# Patient Record
Sex: FEMALE | Race: WHITE | Employment: OTHER | URBAN - METROPOLITAN AREA
[De-identification: names, ages, dates, MRNs, and addresses within clinical notes are randomized per-mention and may not be internally consistent; named-entity substitution may affect disease eponyms.]

---

## 2017-01-08 ENCOUNTER — ED HISTORICAL/CONVERTED ENCOUNTER (OUTPATIENT)
Dept: OTHER | Age: 27
End: 2017-01-08

## 2017-07-14 ENCOUNTER — ED HISTORICAL/CONVERTED ENCOUNTER (OUTPATIENT)
Dept: OTHER | Age: 27
End: 2017-07-14

## 2017-12-01 LAB
ANTIBODY SCREEN, EXTERNAL: POSITIVE
CHLAMYDIA, EXTERNAL: NEGATIVE
HBSAG, EXTERNAL: NEGATIVE
N. GONORRHEA, EXTERNAL: NEGATIVE
RUBELLA, EXTERNAL: NORMAL
TYPE, ABO & RH, EXTERNAL: NORMAL

## 2018-03-26 LAB
ANTIBODY SCREEN, EXTERNAL: NEGATIVE
ANTIBODY SCREEN, EXTERNAL: NEGATIVE
HBSAG, EXTERNAL: NEGATIVE
HIV, EXTERNAL: NON REACTIVE
RPR, EXTERNAL: NON REACTIVE

## 2018-05-21 LAB
GRBS, EXTERNAL: POSITIVE
T. PALLIDUM, EXTERNAL: NON REACTIVE

## 2018-06-24 ENCOUNTER — HOSPITAL ENCOUNTER (INPATIENT)
Age: 28
LOS: 6 days | Discharge: HOME | End: 2018-06-30
Attending: OBSTETRICS & GYNECOLOGY | Admitting: OBSTETRICS & GYNECOLOGY
Payer: OTHER GOVERNMENT

## 2018-06-24 DIAGNOSIS — G89.18 POSTOPERATIVE PAIN: Primary | ICD-10-CM

## 2018-06-24 PROBLEM — Z37.9 NORMAL LABOR: Status: ACTIVE | Noted: 2018-06-24

## 2018-06-24 LAB
ERYTHROCYTE [DISTWIDTH] IN BLOOD BY AUTOMATED COUNT: 16.3 % (ref 11.5–14.5)
HCT VFR BLD AUTO: 34.5 % (ref 35–47)
HGB BLD-MCNC: 11.7 G/DL (ref 11.5–16)
MCH RBC QN AUTO: 29.8 PG (ref 26–34)
MCHC RBC AUTO-ENTMCNC: 33.9 G/DL (ref 30–36.5)
MCV RBC AUTO: 88 FL (ref 80–99)
NRBC # BLD: 0 K/UL (ref 0–0.01)
NRBC BLD-RTO: 0 PER 100 WBC
PLATELET # BLD AUTO: 232 K/UL (ref 150–400)
PMV BLD AUTO: 12.2 FL (ref 8.9–12.9)
RBC # BLD AUTO: 3.92 M/UL (ref 3.8–5.2)
WBC # BLD AUTO: 8.1 K/UL (ref 3.6–11)

## 2018-06-24 PROCEDURE — 3E0P7VZ INTRODUCTION OF HORMONE INTO FEMALE REPRODUCTIVE, VIA NATURAL OR ARTIFICIAL OPENING: ICD-10-PCS | Performed by: OBSTETRICS & GYNECOLOGY

## 2018-06-24 PROCEDURE — 36415 COLL VENOUS BLD VENIPUNCTURE: CPT | Performed by: OBSTETRICS & GYNECOLOGY

## 2018-06-24 PROCEDURE — 3E033VJ INTRODUCTION OF OTHER HORMONE INTO PERIPHERAL VEIN, PERCUTANEOUS APPROACH: ICD-10-PCS | Performed by: OBSTETRICS & GYNECOLOGY

## 2018-06-24 PROCEDURE — 4A0HXCZ MEASUREMENT OF PRODUCTS OF CONCEPTION, CARDIAC RATE, EXTERNAL APPROACH: ICD-10-PCS | Performed by: OBSTETRICS & GYNECOLOGY

## 2018-06-24 PROCEDURE — 75410000002 HC LABOR FEE PER 1 HR

## 2018-06-24 PROCEDURE — 74011250637 HC RX REV CODE- 250/637: Performed by: OBSTETRICS & GYNECOLOGY

## 2018-06-24 PROCEDURE — 65270000029 HC RM PRIVATE

## 2018-06-24 PROCEDURE — 85027 COMPLETE CBC AUTOMATED: CPT | Performed by: OBSTETRICS & GYNECOLOGY

## 2018-06-24 RX ORDER — SODIUM CHLORIDE, SODIUM LACTATE, POTASSIUM CHLORIDE, CALCIUM CHLORIDE 600; 310; 30; 20 MG/100ML; MG/100ML; MG/100ML; MG/100ML
125 INJECTION, SOLUTION INTRAVENOUS CONTINUOUS
Status: DISCONTINUED | OUTPATIENT
Start: 2018-06-25 | End: 2018-06-28

## 2018-06-24 RX ORDER — NALBUPHINE HYDROCHLORIDE 10 MG/ML
10 INJECTION, SOLUTION INTRAMUSCULAR; INTRAVENOUS; SUBCUTANEOUS
Status: DISCONTINUED | OUTPATIENT
Start: 2018-06-24 | End: 2018-06-27 | Stop reason: HOSPADM

## 2018-06-24 RX ORDER — FENTANYL CITRATE 50 UG/ML
100 INJECTION, SOLUTION INTRAMUSCULAR; INTRAVENOUS
Status: DISCONTINUED | OUTPATIENT
Start: 2018-06-24 | End: 2018-06-27 | Stop reason: HOSPADM

## 2018-06-24 RX ORDER — ZOLPIDEM TARTRATE 5 MG/1
5 TABLET ORAL
Status: DISCONTINUED | OUTPATIENT
Start: 2018-06-24 | End: 2018-06-28

## 2018-06-24 RX ORDER — OXYTOCIN IN 5 % DEXTROSE 30/500 ML
1-25 PLASTIC BAG, INJECTION (ML) INTRAVENOUS
Status: DISCONTINUED | OUTPATIENT
Start: 2018-06-25 | End: 2018-06-28

## 2018-06-24 RX ORDER — ALBUTEROL SULFATE 5 MG/ML
SOLUTION RESPIRATORY (INHALATION)
COMMUNITY

## 2018-06-24 RX ORDER — TERBUTALINE SULFATE 1 MG/ML
0.25 INJECTION SUBCUTANEOUS AS NEEDED
Status: DISCONTINUED | OUTPATIENT
Start: 2018-06-24 | End: 2018-06-27 | Stop reason: HOSPADM

## 2018-06-24 RX ORDER — VALACYCLOVIR HYDROCHLORIDE 500 MG/1
500 TABLET, FILM COATED ORAL DAILY
COMMUNITY
End: 2018-06-30

## 2018-06-24 RX ORDER — SODIUM CHLORIDE 0.9 % (FLUSH) 0.9 %
SYRINGE (ML) INJECTION
Status: DISPENSED
Start: 2018-06-24 | End: 2018-06-25

## 2018-06-24 RX ORDER — SODIUM CHLORIDE 0.9 % (FLUSH) 0.9 %
SYRINGE (ML) INJECTION
Status: COMPLETED
Start: 2018-06-24 | End: 2018-06-24

## 2018-06-24 RX ADMIN — DINOPROSTONE 10 MG: 10 INSERT VAGINAL at 17:18

## 2018-06-24 RX ADMIN — Medication 10 ML: at 16:21

## 2018-06-24 RX ADMIN — ZOLPIDEM TARTRATE 5 MG: 5 TABLET ORAL at 21:57

## 2018-06-24 NOTE — PROGRESS NOTES
OB Hospitalist    Cervix- closed/high  Cervidil placed in the posterior fornix of the vagina without difficulty. Fetal tracing Reactive.           Jana Fernandez MD

## 2018-06-24 NOTE — H&P
Labor and Delivery Admission Note  6/24/2018    33 y.o., , female, G1 P 0 Estimated Date of Delivery: 6/19/18 by dates and US presents for IOL for postdates at 5  Reports good fetal movement, no bleeding, and no contractions. Has been on Valtrex daily for  HSV suppressive prophylaxis. PNC: Blood type:             RH: pos            Rubella:             SVII serology: neg             GBS status:   Past Medical History:   Diagnosis Date    Abnormal Papanicolaou smear of cervix 2015    colposcopy , normal since    Anemia     states hx of anemia, has been normal during pregnancy    Asthma     last used inhaler in past week    Genital herpes     states last ourbreak in May, has been taking Valtrex since before pregnancy    Psychiatric problem     depression and anxiety, had been taking medication, but stopped when found out she was pregnant     History reviewed. No pertinent surgical history. OB/GYN: Dr. Gerald Dobson:   Current Facility-Administered Medications   Medication Dose Route Frequency    dinoprostone (CERVIDIL) 10 mg vaginal insert 10 mg  10 mg Vaginal NOW    sodium chloride (NS) flush        lactated ringers bolus infusion 500-1,000 mL  500-1,000 mL IntraVENous PRN    terbutaline (BRETHINE) injection 0.25 mg  0.25 mg SubCUTAneous PRN    nalbuphine (NUBAIN) injection 10 mg  10 mg IntraVENous Q2H PRN    fentaNYL citrate (PF) injection 100 mcg  100 mcg IntraVENous Q1H PRN     Allergies: No Known Allergies  Pertinent ROS: per HPI   Family History   Problem Relation Age of Onset    Other Mother      hypothyroidism    Hypertension Brother     Diabetes Maternal Grandfather     Hypertension Maternal Grandfather      Social History     Social History    Marital status:      Spouse name: N/A    Number of children: N/A    Years of education: N/A     Occupational History    Not on file.      Social History Main Topics    Smoking status: Former Smoker     Quit date: 9/24/2017    Smokeless tobacco: Former User     Quit date: 2016    Alcohol use No    Drug use: No    Sexual activity: Yes     Partners: Male     Other Topics Concern    Not on file     Social History Narrative       OBJECTIVE:  Gravid , female NAD  No data recorded. Visit Vitals    Ht 5' 2\" (1.575 m)    Wt 77.6 kg (171 lb)    BMI 31.28 kg/m2       Labs:  No results found for: WBC    Exam:  HEENT:  normal   Lungs:  clear  Cor:  RRR  Abdomen:  Fundal height 40 cm                    Gravid, relaxed                    Clinical EFW 8 and 1/2 lbs  Fetal heart rate tracin, +accels, moderate varaibility  Contraction pattern: irregular  Cervix:  Closed/thick  Fluid:  Intact      Impression:  IUP at 40 weeks 5 days for IOL  Fetal tracing reactive  Plan for Cervidil placement  Vertex confirmed by Ultrasound.     Adele Castro MD

## 2018-06-24 NOTE — IP AVS SNAPSHOT
[address] 
[phone] Patient: Jesus Turner MRN: [id number] :1990 A check nathan indicates which time of day the medication should be taken. My Medications START taking these medications Instructions Each Dose to Equal  
 Morning Noon Evening Bedtime  
 ibuprofen 800 mg tablet Commonly known as:  MOTRIN Your last dose was: Your next dose is: Take 1 Tab by mouth every eight (8) hours as needed for Pain. 800 mg  
    
   
   
   
  
 oxyCODONE-acetaminophen 5-325 mg per tablet Commonly known as:  PERCOCET Your last dose was: Your next dose is: Take 1 Tab by mouth every six (6) hours as needed. Max Daily Amount: 4 Tabs. 1 Tab CONTINUE taking these medications Instructions Each Dose to Equal  
 Morning Noon Evening Bedtime  
 albuterol 5 mg/mL nebulizer solution Commonly known as:  PROVENTIL Your last dose was: Your next dose is:    
   
   
 by Nebulization route every four (4) hours as needed for Wheezing. Pt states 2 puffs as needed   Indications: Acute Asthma Attack PNV66-Iron Fumarate-FA-DSS-DHA 26-1.2- mg Cap Your last dose was: Your next dose is: Take 1 Tab by mouth daily. Indications: pregnancy 1 Tab STOP taking these medications   
 traMADol 50 mg tablet Commonly known as:  ULTRAM  
   
  
 VALTREX 500 mg tablet Generic drug:  valACYclovir Where to Get Your Medications Information on where to get these meds will be given to you by the nurse or doctor. ! Ask your nurse or doctor about these medications  
  ibuprofen 800 mg tablet  
 oxyCODONE-acetaminophen 5-325 mg per tablet

## 2018-06-24 NOTE — PROGRESS NOTES
A  admitted to [facility] under the services of Dr. uSman Frey for scheduled induction, pt states because she is overdue [facility] 2018). Pt states she has hx of HSV II with last outbreak in May of 2018, and has been on Valtrex since before her pregnancy. Pt also states she has hx of recent chest pain/tightness and SOB and was sen by cardiologist 2 weeks ago and her EKG was WNL. States she was scheduled for an echo but had to cancel that appt because it conflicted with her OB visit. Pt states hx of MRSA in , but no outbreaks with this pregnancy. 1605:  Dr. Suman Frey notified of pt's arrival, states Dr. Corinne Garcia will place Cervidil. 162:  Dr. Corinne Garcia notified of pt's arrival.  1700:  Dr. Corinne Garcia at bedside, [address] done and vertex presentation confirmed. Pt up to BE to void. 1718:  Vag exam per Dr. Corinne Garcia:  Cervix closed and high, Cervidil placed, see MAR.  1810:  MRSA culture sent. 1838:  Orders received from Dr. Corinne Garcia to discontinue Cervidil at 0500 tomorrow and start Pitocin infusion at 0600. Orders also received for antibiotics to start with labor start of SROM. Notified Dr. Corinne Garcia of MRSA culture done due to no previous negative culture since .  184:  Orders received from Dr. Corinne Garcia: if reactive FHR strip at midnight, monitoring may be discontinued until Pitocin started in the AM.  1900:  Pt and  having dinner. 1940:  Bedside and Verbal shift change report given to GONZALO Guerrero RN (oncoming nurse) by SIMA Khalil RN (offgoing nurse). Report included the following information SBAR, Kardex, Intake/Output, MAR and Recent Results.

## 2018-06-24 NOTE — IP AVS SNAPSHOT
[address] [facility] 
[phone] Patient: Edwina Montenegro MRN: [id number] :1990 About your hospitalization You were admitted on:  2018 You last received care in the:  [address] [facility] You were discharged on:  2018 Why you were hospitalized Your primary diagnosis was:  Not on File Your diagnoses also included:  Normal Labor Follow-up Information Follow up With Details Comments Contact Info Vitaly Farias DO In 6 weeks  [address] [facility] [address] [place] [address] 
[phone] Discharge Orders None A check nathan indicates which time of day the medication should be taken. My Medications START taking these medications Instructions Each Dose to Equal  
 Morning Noon Evening Bedtime  
 ibuprofen 800 mg tablet Commonly known as:  MOTRIN Your last dose was: Your next dose is: Take 1 Tab by mouth every eight (8) hours as needed for Pain. 800 mg  
    
   
   
   
  
 oxyCODONE-acetaminophen 5-325 mg per tablet Commonly known as:  PERCOCET Your last dose was: Your next dose is: Take 1 Tab by mouth every six (6) hours as needed. Max Daily Amount: 4 Tabs. 1 Tab CONTINUE taking these medications Instructions Each Dose to Equal  
 Morning Noon Evening Bedtime  
 albuterol 5 mg/mL nebulizer solution Commonly known as:  PROVENTIL Your last dose was: Your next dose is:    
   
   
 by Nebulization route every four (4) hours as needed for Wheezing. Pt states 2 puffs as needed   Indications: Acute Asthma Attack PNV66-Iron Fumarate-FA-DSS-DHA 26-1.2- mg Cap Your last dose was: Your next dose is: Take 1 Tab by mouth daily. Indications: pregnancy 1 Tab STOP taking these medications   
 traMADol 50 mg tablet Commonly known as:  ULTRAM  
   
  
 VALTREX 500 mg tablet Generic drug:  valACYclovir Where to Get Your Medications Information on where to get these meds will be given to you by the nurse or doctor. ! Ask your nurse or doctor about these medications  
  ibuprofen 800 mg tablet  
 oxyCODONE-acetaminophen 5-325 mg per tablet Opioid Education Prescription Opioids: What You Need to Know: 
 
Prescription opioids can be used to help relieve moderate-to-severe pain and are often prescribed following a surgery or injury, or for certain health conditions. These medications can be an important part of treatment but also come with serious risks. Opioids are strong pain medicines. Examples include hydrocodone, oxycodone, fentanyl, and morphine. Heroin is an example of an illegal opioid. It is important to work with your health care provider to make sure you are getting the safest, most effective care. WHAT ARE THE RISKS AND SIDE EFFECTS OF OPIOID USE? Prescription opioids carry serious risks of addiction and overdose, especially with prolonged use. An opioid overdose, often marked by slow breathing, can cause sudden death. The use of prescription opioids can have a number of side effects as well, even when taken as directed. · Tolerance-meaning you might need to take more of a medication for the same pain relief · Physical dependence-meaning you have symptoms of withdrawal when the medication is stopped. Withdrawal symptoms can include nausea, sweating, chills, diarrhea, stomach cramps, and muscle aches. Withdrawal can last up to several weeks, depending on which drug you took and how long you took it. · Increased sensitivity to pain · Constipation · Nausea, vomiting, and dry mouth · Sleepiness and dizziness · Confusion · Depression · Low levels of testosterone that can result in lower sex drive, energy, and strength · Itching and sweating RISKS ARE GREATER WITH:      
· History of drug misuse, substance use disorder, or overdose · Mental health conditions (such as depression or anxiety) · Sleep apnea · Older age (72 years or older) · Pregnancy Avoid alcohol while taking prescription opioids. Also, unless specifically advised by your health care provider, medications to avoid include: · Benzodiazepines (such as Xanax or Valium) · Muscle relaxants (such as Soma or Flexeril) · Hypnotics (such as Ambien or Lunesta) · Other prescription opioids KNOW YOUR OPTIONS Talk to your health care provider about ways to manage your pain that don't involve prescription opioids. Some of these options may actually work better and have fewer risks and side effects. Options may include: 
· Pain relievers such as acetaminophen, ibuprofen, and naproxen · Some medications that are also used for depression or seizures · Physical therapy and exercise · Counseling to help patients learn how to cope better with triggers of pain and stress. · Application of heat or cold compress · Massage therapy · Relaxation techniques Be Informed Make sure you know the name of your medication, how much and how often to take it, and its potential risks & side effects. IF YOU ARE PRESCRIBED OPIOIDS FOR PAIN: 
· Never take opioids in greater amounts or more often than prescribed. Remember the goal is not to be pain-free but to manage your pain at a tolerable level. · Follow up with your primary care provider to: · Work together to create a plan on how to manage your pain. · Talk about ways to help manage your pain that don't involve prescription opioids. · Talk about any and all concerns and side effects. · Help prevent misuse and abuse. · Never sell or share prescription opioids · Help prevent misuse and abuse. · Store prescription opioids in a secure place and out of reach of others (this may include visitors, children, friends, and family). · Safely dispose of unused/unwanted prescription opioids: Find your community drug take-back program or your pharmacy mail-back program, or flush them down the toilet, following guidance from the Food and Drug Administration (www.fda.gov/Drugs/ResourcesForYou). · Visit www.cdc.gov/drugoverdose to learn about the risks of opioid abuse and overdose. · If you believe you may be struggling with addiction, tell your health care provider and ask for guidance or call [employer] at 3-896-221-BKJK. Discharge Instructions POSTPARTUM DISCHARGE INSTRUCTIONS Name:  Graham Miranda YOB: 1990 Admission Diagnosis:  Normal labor Normal labor Discharge Diagnosis:   
Problem List as of 2018  Never Reviewed Codes Class Noted - Resolved Normal labor ICD-10-CM: O80, Z37.9 ICD-9-CM: 387  2018 - Present Attending Physician:  Erich Small, DO Delivery Type:   Section: What to Expect at [facility] Your Recovery: A  section, or , is surgery to deliver your baby through a cut, called an incision that the doctor makes in your lower belly and uterus. You may have some pain in your lower belly and need pain medicine for 1 to 2 weeks. You can expect some vaginal bleeding for several weeks. You will probably need about 6 weeks to fully recover. It is important to take it easy while the incision is healing. Avoid heavy lifting, strenuous activities, or exercises that strain the belly muscles while you are recovering. Ask a family member or friend for help with housework, cooking, and shopping.  
This care sheet gives you a general idea about how long it will take for you to recover. But each person recovers at a different pace. Follow the steps below to get better as quickly as possible. How can you care for yourself at home? Activity · Rest when you feel tired. Getting enough sleep will help you recover. · Try to walk each day. Start by walking a little more than you did the day before. Bit by bit, increase the amount you walk. Walking boosts blood flow and helps prevent pneumonia, constipation, and blood clots. · Avoid strenuous activities, such as bicycle riding, jogging, weightlifting, and aerobic exercise, for 6 weeks or until your doctor says it is okay. · Until your doctor says it is okay, do not lift anything heavier than your baby. · Do not do sit-ups or other exercise that strain the belly muscles for 6 weeks or until your doctor says it is okay. · Hold a pillow over your incision when you cough or take deep breaths. This will support your belly and decrease your pain. · You may shower as usual. Pat the incision dry when you are done. · You will have some vaginal bleeding. Wear sanitary pads. Do not douche or use tampons until your doctor says it is okay. · Ask your doctor when you can drive again. · You will probably need to take at least 6 weeks off work. It depends on the type of work you do and how you feel. · Wait until you are healed (about 4 to 6 weeks) before you have sexual intercourse. Your doctor will tell you when it is okay to have sex. · Talk to your doctor about birth control. You can get pregnant even before your period returns. Also, you can get pregnant while you are breast-feeding. Incision care Your skin is your body's first line of defense against germs, but an incision site leaves an easy way for germs to enter your body. To prevent infection: · Clean your hands frequently and before and after changing any touching any dressings.  
 
· If you have strips of tape on the incision, leave the tape on for a week or until it falls off. · Look at your incision closely every day for any changes. Contact your doctor if you experience any signs of infection, such as fever or increased redness at the surgical site. · Wash the area daily with warm, soapy water, and pat it dry. Don't use hydrogen peroxide or alcohol, which can slow healing. You may cover the area with a gauze bandage if it weeps or rubs against clothing. Change the bandage every day. · Keep the area clean and dry. Diet · You can eat your normal diet. If your stomach is upset, try bland, low-fat foods like plain rice, broiled chicken, toast, and yogurt. · Drink plenty of fluids (unless your doctor tells you not to). · You may notice that your bowel movements are not regular right after your surgery. This is common. Try to avoid constipation and straining with bowel movements. You may want to take a fiber supplement every day. If you have not had a bowel movement after a couple of days, ask your doctor about taking a mild laxative. · If you are breast-feeding, do not drink any alcohol. Medicines · Take pain medicines exactly as directed. · If the doctor gave you a prescription medicine for pain, take it as prescribed. · If you are not taking a prescription pain medicine, ask your doctor if you can take an over-the-counter medicine such as acetaminophen (Tylenol), ibuprofen (Advil, Motrin), or naproxen (Aleve), for cramps. Read and follow all instructions on the label. Do not take aspirin, because it can cause more bleeding. Do not take acetaminophen (Tylenol) and other acetaminophen containing medications (i.e. Percocet) at the same time. · If you think your pain medicine is making you sick to your stomach: 
· Take your medicine after meals (unless your doctor has told you not to). · Ask your doctor for a different pain medicine. · If your doctor prescribed antibiotics, take them as directed.  Do not stop taking them just because you feel better. You need to take the full course of antibiotics. Mental Health · Many women get the \"baby blues\" during the first few days after childbirth. You may lose sleep, feel irritable, and cry easily. You may feel happy one minute and sad the next. Hormone changes are one cause of these emotional changes. Also, the demands of a new baby, along with visits from relatives or other family needs, add to a mother's stress. The \"baby blues\" often peak around the fourth day. Then they ease up in less than 2 weeks. · If your moodiness or anxiety lasts for more than 2 weeks, or if you feel like life is not worth living, you may have postpartum depression. This is different for each mother. Some mothers with serious depression may worry intensely about their infant's well-being. Others may feel distant from their child. Some mothers might even feel that they might harm their baby. A mother may have signs of paranoia, wondering if someone is watching her. · With all the changes in your life, you may not know if you are depressed. Pregnancy sometimes causes changes in how you feel that are similar to the symptoms of depression. · Symptoms of depression include: · Feeling sad or hopeless and losing interest in daily activities. These are the most common symptoms of depression. · Sleeping too much or not enough. · Feeling tired. You may feel as if you have no energy. · Eating too much or too little. · POSTPARTUM SUPPORT INTERNATIONAL (PSI) offers a Warm line; Chat with the Expert phone sessions; Information and Articles about Pregnancy and Postpartum Mood Disorders; Comprehensive List of Free Support Groups; Knowledgeable local coordinators who will offer support, information, and resources; Guide to Resources on [employer]; Calendar of events in the  mood disorders community;  Latest News and Research; and [facility] Section for Access and Networking. Remember - You are not alone; You are not to blame; With help, you will be well. 7-435-759-PPD(5876). WWW. POSTPARTUM. NET · Writing or talking about death, such as writing suicide notes or talking about guns, knives, or pills. Keep the numbers for these national suicide hotlines: 4-220-563-TALK (3-386.363.8524) and 7-171-EQHVNGX (5-376.315.2605). If you or someone you know talks about suicide or feeling hopeless, get help right away. Other instructions · If you breast-feed your baby, you may be more comfortable while you are healing if you place the baby so that he or she is not resting on your belly. Try tucking your baby under your arm, with his or her body along the side you will be feeding on. Support your baby's upper body with your arm. With that hand you can control your baby's head to bring his or her mouth to your breast. This is sometimes called the football hold. Follow-up care is a key part of your treatment and safety. Be sure to make and go to all appointments, and call your doctor if you are having problems. It's also a good idea to know your test results and keep a list of the medicines you take. When should you call for help? Call 911 anytime you think you may need emergency care. For example, call if: 
· You are thinking of hurting yourself, your baby, or anyone else. · You passed out (lost consciousness). · You have symptoms of a blood clot in your lung (called a pulmonary embolism). These may include: 
· Sudden chest pain. · Trouble breathing. · Coughing up blood. Call your doctor now or seek immediate medical care if: 
 
· You have severe vaginal bleeding. · You are soaking through a pad each hour for 2 or more hours. · Your vaginal bleeding seems to be getting heavier or is still bright red 4 days after delivery. · You are dizzy or lightheaded, or you feel like you may faint. · You are vomiting or cannot keep fluids down. · You have a fever. · You have new or more belly pain. · You have loose stitches, or your incision comes open. · You have symptoms of infection, such as: 
· Increased pain, swelling, warmth, or redness. · Red streaks leading from the incision. · Pus draining from the incision. · A fever · You pass tissue (not just blood). · Your vaginal discharge smells bad. · Your belly feels tender or full and hard. · Your breasts are continuously painful or red. · You feel sad, anxious, or hopeless for more than a few days. · You have sudden, severe pain in your belly. · You have symptoms of a blood clot in your leg (called a deep vein thrombosis), such as: 
· Pain in your calf, back of the knee, thigh, or groin. · Redness and swelling in your leg or groin. · You have symptoms of preeclampsia, such as: 
· Sudden swelling of your face, hands, or feet. · New vision problems (such as dimness or blurring). · A severe headache. · Your blood pressure is higher than it should be or rises suddenly. · You have new nausea or vomiting. Watch closely for changes in your health, and be sure to contact your doctor if you have any problems. Additional Information:  Preventing Infection at Home We care about preventing infection and avoiding the spread of germs - not only when you are in the hospital but also when you return home. When you return home from the hospital, it's important to take the following steps to help prevent infection and avoid spreading germs that could infect you and others. Ask everyone in your home to follow these guidelines, too. Clean Your Hands · Clean your hands whenever your hands are visibly dirty, before you eat, before or after touching your mouth, nose or eyes, and before preparing food. Clean them after contact with body fluids, using the restroom, touching animals or changing diapers. · When washing hands, wet them with warm water and work up a lather.  Rub hands for at least 15 seconds, then rinse them and pat them dry with a clean towel or paper towel. · When using hand sanitizers, it should take about 15 seconds to rub your hands dry. If not, you probably didn't apply enough . Cover Your Sneeze or Cough Germs are released into the air whenever you sneeze or cough. To prevent the spread of infection: · Turn away from other people before coughing or sneezing. · Cover your mouth or nose with a tissue when you cough or sneeze. Put the tissue in the trash. · If you don't have a tissue, cough or sneeze into your upper sleeve, not your hands. · Always clean your hands after coughing or sneezing. Care for Wounds Your skin is your body's first line of defense against germs, but an open wound leaves an easy way for germs to enter your body. To prevent infection: · Clean your hands before and after changing wound dressings, and wear gloves to change dressings if recommended by your doctor. · Take special care with IV lines or other devices inserted into the body. If you must touch them, clean your hands first. 
· Follow any specific instructions from your doctor to care for your wounds. Contact your doctor if you experience any signs of infection, such as fever or increased redness at the surgical or wound site. Keep a [place] [address] · Clean or wipe commonly touched hard surfaces like door handles, sinks, tabletops, phones and TV remotes. · Use products labeled \"disinfectant\" to kill harmful bacteria and viruses. · Use a clean cloth or paper towel to clean and dry surfaces. Wiping surfaces with a dirty dishcloth, sponge or towel will only spread germs. · Never share toothbrushes, mcleod, drinking glasses, utensils, razor blades, face cloths or bath towels to avoid spreading germs. · Be sure that the linens that you sleep on are clean. · Keep pets away from wounds and wash your hands after touching pets, their toys or bedding. We care about you and your health. Remember, preventing infections is a  
team effort between you, your family, friends and health care providers. Postpartum Support PARENTS:  Are you feeling sad or depressed? Is it difficult for you to enjoy yourself? Do you feel more irritable or tense? Do you feel anxious or panicky? Are you having difficulty bonding with your baby? Do you feel as if you are \"out of control\" or \"going crazy\"? Are you worried that you might hurt your baby or yourself? FAMILIES: Do you worry that something is wrong but don't know how to help? Do you think that your partner or spouse is having problems coping? Are you worried that it may never get better? While many women experience some mild mood change or \"the blues\" during or after the birth of a child, 1 in 9 women experience more significant symptoms of depression or anxiety. 1 in 10 Dads become depressed during the first year. Things you can do Being a good parent includes taking care of yourself. If you take care of yourself, you will be able to take better care of your baby and your family. · Talk to a counselor or healthcare provider who has training in  mood and anxiety problems. · Learn as much as you can about pregnancy and postpartum depression and anxiety. · Get support from family and friends. Ask for help when you need it. · Join a support group in your area or online. · Keep active by walking, stretching or whatever form of exercise helps you to feel better. · Get enough rest and time for yourself. · Eat a healthy diet. · Don't give up! It may take more than one try to get the right help you need. These are general instructions for a healthy lifestyle: No smoking/ No tobacco products/ Avoid exposure to second hand smoke Surgeon General's Warning:  Quitting smoking now greatly reduces serious risk to your health. Obesity, smoking, and sedentary lifestyle greatly increases your risk for illness A healthy diet, regular physical exercise & weight monitoring are important for maintaining a healthy lifestyle Recognize signs and symptoms of STROKE: 
 
F-face looks uneven A-arms unable to move or move unevenly S-speech slurred or non-existent T-time-call 911 as soon as signs and symptoms begin - DO NOT go  
    back to bed or wait to see if you get better - TIME IS BRAIN. I have had the opportunity to make my options or choices for discharge. I have received and understand these instructions. [employer] Announcement We are excited to announce that we are making your provider's discharge notes available to you in [employer]. You will see these notes when they are completed and signed by the physician that discharged you from your recent hospital stay. If you have any questions or concerns about any information you see in [employer], please call the Health Information Department where you were seen or reach out to your Primary Care Provider for more information about your plan of care. Introducing [facility] & [facility] SERVICES! Shaquille Steele introduces [employer] patient portal. Now you can access parts of your medical record, email your doctor's office, and request medication refills online. 1. In your internet browser, go to [email or web address] Weekly Flower Deliveryhart 2. Click on the First Time User? Click Here link in the Sign In box. You will see the New Member Sign Up page. 3. Enter your [employer] Access Code exactly as it appears below. You will not need to use this code after youve completed the sign-up process. If you do not sign up before the expiration date, you must request a new code. · [employer] Access Code: [email or web address] Expires: 9/28/2018  1:28 PM 
 
4. Enter the last four digits of your Social Security Number (xxxx) and Date of Birth (mm/dd/yyyy) as indicated and click Submit.  You will be taken to the next sign-up page. 5. Create a [employer] ID. This will be your [employer] login ID and cannot be changed, so think of one that is secure and easy to remember. 6. Create a [employer] password. You can change your password at any time. 7. Enter your Password Reset Question and Answer. This can be used at a later time if you forget your password. 8. Enter your e-mail address. You will receive e-mail notification when new information is available in [address] 9. Click Sign Up. You can now view and download portions of your medical record. 10. Click the Download Summary menu link to download a portable copy of your medical information. If you have questions, please visit the Frequently Asked Questions section of the [employer] website. Remember, [employer] is NOT to be used for urgent needs. For medical emergencies, dial 911. Now available from your iPhone and Android! Introducing Matthew Ruiz As a [facility] patient, I wanted to make you aware of our electronic visit tool called Matthew Ruiz. [facility] 24/7 allows you to connect within minutes with a medical provider 24 hours a day, seven days a week via a mobile device or tablet or logging into a secure website from your computer. You can access Matthew Ruiz from anywhere in the [place]. A virtual visit might be right for you when you have a simple condition and feel like you just dont want to get out of bed, or cant get away from work for an appointment, when your regular [facility] provider is not available (evenings, weekends or holidays), or when youre out of town and need minor care. Electronic visits cost only $49 and if the Patino Huron [facility] 24/7 provider determines a prescription is needed to treat your condition, one can be electronically transmitted to a nearby pharmacy*. Please take a moment to enroll today if you have not already done so.   The enrollment process is free and takes just a few minutes. To enroll, please download the [employer] 24/7 johnnie to your tablet or phone, or visit [email or web address] to enroll on your computer. And, as an [address] [place] [address] patient with a [email or web address] account, the results of your visits will be scanned into your electronic medical record and your primary care provider will be able to view the scanned results. We urge you to continue to see your regular [employer] provider for your ongoing medical care. And while your primary care provider may not be the one available when you seek a [employer] virtual visit, the peace of mind you get from getting a real diagnosis real time can be priceless. For more information on [employer], view our Frequently Asked Questions (FAQs) at [email or web address]. Sincerely, 
 
Derek Bearden MD 
Chief Medical Officer [employer] *:  certain medications cannot be prescribed via [employer] Providers Seen During Your Hospitalization Provider Specialty Primary office phone Luciana Elaine DO Obstetrics & Gynecology [phone] Your Primary Care Physician (PCP) Primary Care Physician Office Phone Office Fax NONE ** None ** ** None ** You are allergic to the following No active allergies Recent Documentation Height Weight Breastfeeding? BMI OB Status Smoking Status 1.575 m 77.6 kg Yes 31.28 kg/m2 Recent pregnancy Former Smoker Emergency Contacts Name Discharge Info Relation Home Work Mobile Sachin Ornelascolm DISCHARGE CAREGIVER [3] Spouse [3]   [phone] Patient Belongings The following personal items are in your possession at time of discharge: 
  Dental Appliances: None  Visual Aid: None      Home Medications: None   Jewelry: Body Piercing, Necklace (nose ring)  Clothing: At bedside    Other Valuables: Camera, [employer], Caralee Miss Please provide this summary of care documentation to your next provider. Signatures-by signing, you are acknowledging that this After Visit Summary has been reviewed with you and you have received a copy. Patient Signature:  ____________________________________________________________ Date:  ____________________________________________________________  
  
[facility] Provider Signature:  ____________________________________________________________ Date:  ____________________________________________________________

## 2018-06-25 LAB
BACTERIA SPEC CULT: NORMAL
BACTERIA SPEC CULT: NORMAL
SERVICE CMNT-IMP: NORMAL

## 2018-06-25 PROCEDURE — 65270000029 HC RM PRIVATE

## 2018-06-25 PROCEDURE — 74011250637 HC RX REV CODE- 250/637: Performed by: OBSTETRICS & GYNECOLOGY

## 2018-06-25 PROCEDURE — 74011250636 HC RX REV CODE- 250/636: Performed by: OBSTETRICS & GYNECOLOGY

## 2018-06-25 PROCEDURE — 75410000002 HC LABOR FEE PER 1 HR

## 2018-06-25 PROCEDURE — 74011000258 HC RX REV CODE- 258: Performed by: OBSTETRICS & GYNECOLOGY

## 2018-06-25 RX ORDER — VALACYCLOVIR HYDROCHLORIDE 500 MG/1
500 TABLET, FILM COATED ORAL DAILY
Status: DISCONTINUED | OUTPATIENT
Start: 2018-06-25 | End: 2018-06-28

## 2018-06-25 RX ORDER — DOCUSATE SODIUM 100 MG/1
100 CAPSULE, LIQUID FILLED ORAL DAILY
Status: DISCONTINUED | OUTPATIENT
Start: 2018-06-25 | End: 2018-06-28

## 2018-06-25 RX ADMIN — SODIUM CHLORIDE, SODIUM LACTATE, POTASSIUM CHLORIDE, AND CALCIUM CHLORIDE 125 ML/HR: 600; 310; 30; 20 INJECTION, SOLUTION INTRAVENOUS at 14:41

## 2018-06-25 RX ADMIN — Medication 2 MILLI-UNITS/MIN: at 06:01

## 2018-06-25 RX ADMIN — MISOPROSTOL 50 MCG: 100 TABLET ORAL at 08:58

## 2018-06-25 RX ADMIN — VALACYCLOVIR HYDROCHLORIDE 500 MG: 500 TABLET, FILM COATED ORAL at 08:57

## 2018-06-25 RX ADMIN — ZOLPIDEM TARTRATE 5 MG: 5 TABLET ORAL at 22:08

## 2018-06-25 RX ADMIN — MISOPROSTOL 25 MCG: 100 TABLET ORAL at 21:22

## 2018-06-25 RX ADMIN — SODIUM CHLORIDE 5 MILLION UNITS: 900 INJECTION, SOLUTION INTRAVENOUS at 06:04

## 2018-06-25 RX ADMIN — DOCUSATE SODIUM 100 MG: 100 CAPSULE, LIQUID FILLED ORAL at 19:28

## 2018-06-25 RX ADMIN — MISOPROSTOL 25 MCG: 100 TABLET ORAL at 12:52

## 2018-06-25 RX ADMIN — SODIUM CHLORIDE, SODIUM LACTATE, POTASSIUM CHLORIDE, AND CALCIUM CHLORIDE 125 ML/HR: 600; 310; 30; 20 INJECTION, SOLUTION INTRAVENOUS at 06:01

## 2018-06-25 RX ADMIN — MISOPROSTOL 25 MCG: 100 TABLET ORAL at 17:22

## 2018-06-25 NOTE — PROGRESS NOTES
1535- Bedside shift change report given to FLORES Avila RN (oncoming nurse) by SIMA Copeland RN (offgoing nurse). Report included the following information SBAR, Intake/Output, MAR and Recent Results.

## 2018-06-25 NOTE — PROGRESS NOTES
1930: Bedside, Verbal and Written shift change report given to GONZALO Mcmahon (oncoming nurse) by FLORES Roberson RN (offgoing nurse). Report included the following information SBAR, MAR and Recent Results. 1935: Spoke with Dr. Ankit Malloy on the phone about the [address] for patient. Continue with 4th dose of cytotec and the hospitalist will be taking over care of the patient for the night. 2005: Patient up to shower. 2035: Patient ambulatory around the unit. 2115: Dr. Jaylen Graf at bedside discussing [address] with patient. 1/50%/high    0730: Bedside, Verbal and Written shift change report given to NESTOR Simmons RN (oncoming nurse) by GONZALO Castellon RN (offgoing nurse). Report included the following information SBAR, MAR and Recent Results.

## 2018-06-25 NOTE — PROGRESS NOTES
Labor Progress Note  Patient seen, fetal heart rate and contraction pattern evaluated, patient examined. Minimal cramping. Patient Vitals for the past 2 hrs:   BP Temp Pulse Resp   18 1211 107/64 99.1 °F (37.3 °C) 61 18   18 1100 115/69 - (!) 59 18       Physical Exam:  Cervical Exam:  50/1/-4  Uterine Activity: 1-3/10, irregular  Fetal Heart Rate: Bsln 140, mod shemar, +accels, no decels, cat I    Assessment/Plan: 34 y/o  at 40w6d undergoing post dates IOL. GBS+  1. IOL  -S/p cervidil, cytotec 50mcg with minimal cervical change. Will give 25mcg cytotec at this time.    -Discussed Cook balloon as ripening agent when cx exam would allow  -FHT cat I reassuring  -GBS+, will add abx when active labor. 2. HSV  -Continue suppression, no s/s outbreak  3. Remote h/o +MRSA (), never had CHESTER  -Contact precautions until MRSA swab during this admission results.     Signed By: Nano Grover DO     2018

## 2018-06-25 NOTE — PROGRESS NOTES
1930: Bedside, Verbal and Written shift change report given to GONZALO Price RN (oncoming nurse) by Marichuy Haywood RN (offgoing nurse). Report included the following information SBAR, MAR and Recent Results. 2022: IV removed because patient said it was very uncomfortable and hurting. Sore to touch. Patient off monitor to shower. 0000: Patient sleeping. Off monitors for the night. 0500: Up to bathroom. Cervidil removed by patient. 0730: Bedside, Verbal and Written shift change report given to SIMA Haywood RN (oncoming nurse) by GONZAOL Price RN (offgoing nurse). Report included the following information SBAR, MAR and Recent Results.

## 2018-06-25 NOTE — PROGRESS NOTES
200 Dr. Willi Rajan at bedside, viewed fetal tracing, discussed POC. SVE 1/50/high.  Will repeat cytotec buccal

## 2018-06-25 NOTE — PROGRESS NOTES
Labor Progress Note  Patient seen, fetal heart rate and contraction pattern evaluated, patient examined. Much more uncomfortable    Patient Vitals for the past 2 hrs:   BP Temp Pulse Resp   18 1611 129/86 98.1 °F (36.7 °C) 64 16       Physical Exam:  Cervical Exam:  50/1/-3  Uterine Activity: -3/10, irregular  Fetal Heart Rate: Bsln 150, mod shemar, +accels, no decels, cat I    Assessment/Plan: 34 y/o  at 40w6d undergoing post dates IOL. GBS+  1. IOL  -S/p cervidil, cytotec 50mcg/25mcg. Will give 3rd dose cytotec now, contractions more painful   -Placement of Cook balloon attempted - able to get through external os but not through internal os. -FHT cat I reassuring  -GBS+, will add abx when active labor. 2. HSV  -Continue suppression, no s/s outbreak  3. Remote h/o +MRSA (), never had CHESTER  -Contact precautions until MRSA swab during this admission results.     Brielle Martin, DO

## 2018-06-25 NOTE — PROGRESS NOTES
Labor Progress Note  Patient seen, fetal heart rate and contraction pattern evaluated, patient examined. Mild cramping    BP 110s-120s/60s-70s    Physical Exam:  Cervical Exam:  50/ft/high/posterior  Uterine Activity: 3-4/10, irregular  Fetal Heart Rate: Bsln 130, mod shemar, +accels, no decels, cat I    Assessment/Plan: 32 y/o  at 40w6d undergoing post dates IOL. GBS+  1. IOL  -S/p cervidil, no cervical change  -Will stop pitocin (2/2 dong score) and give cytotec per protocol   -Discussed Cook balloon as ripening agent when cx exam would allow  -FHT cat I reassuring  2. HSV  -Continue suppression, no s/s outbreak  3. Remote h/o +MRSA (), never had CHESTER  -Contact precautions until MRSA swab during this admission results.     Maryjane Query, DO

## 2018-06-25 NOTE — PROGRESS NOTES
Bedside and Verbal shift change report given to SIMA Santiago RN (oncoming nurse) by GONZALO Florian RN (offgoing nurse). Report included the following information SBAR, Kardex, Intake/Output, MAR and Recent Results. Pt resting in bed, no complaints at this time. 7686:  Dr. Cynthia Clark at bedside, vag exam:  FT, high. Orders received to stop Pitocin and to give Cytotec. Dr. Cynthia Clark discussing with pt plan of care. 4300:  Cytotec given, see MAR.  1225:  Report given to Mauricio BERMEO RN for lunch coverage. 1305:  Care resumed per David Palomo RN. 1315:  Apple juice and prune juice given. 1535:  Bedside and Verbal shift change report given to FLORES Tapia RN (oncoming nurse) by SIMA Santiago RN (offgoing nurse). Report included the following information SBAR, Kardex, Intake/Output, MAR and Recent Results.

## 2018-06-26 PROCEDURE — 74011250637 HC RX REV CODE- 250/637: Performed by: OBSTETRICS & GYNECOLOGY

## 2018-06-26 PROCEDURE — 74011250636 HC RX REV CODE- 250/636: Performed by: OBSTETRICS & GYNECOLOGY

## 2018-06-26 PROCEDURE — 75410000002 HC LABOR FEE PER 1 HR

## 2018-06-26 PROCEDURE — 65270000029 HC RM PRIVATE

## 2018-06-26 RX ORDER — NALOXONE HYDROCHLORIDE 0.4 MG/ML
0.4 INJECTION, SOLUTION INTRAMUSCULAR; INTRAVENOUS; SUBCUTANEOUS AS NEEDED
Status: DISCONTINUED | OUTPATIENT
Start: 2018-06-26 | End: 2018-06-27 | Stop reason: HOSPADM

## 2018-06-26 RX ORDER — BUPIVACAINE HYDROCHLORIDE 2.5 MG/ML
30 INJECTION, SOLUTION EPIDURAL; INFILTRATION; INTRACAUDAL ONCE
Status: ACTIVE | OUTPATIENT
Start: 2018-06-26 | End: 2018-06-27

## 2018-06-26 RX ORDER — FENTANYL CITRATE 50 UG/ML
100 INJECTION, SOLUTION INTRAMUSCULAR; INTRAVENOUS ONCE
Status: ACTIVE | OUTPATIENT
Start: 2018-06-26 | End: 2018-06-27

## 2018-06-26 RX ORDER — OXYTOCIN/RINGER'S LACTATE 20/1000 ML
1-25 PLASTIC BAG, INJECTION (ML) INTRAVENOUS
Status: DISCONTINUED | OUTPATIENT
Start: 2018-06-26 | End: 2018-06-28

## 2018-06-26 RX ORDER — EPHEDRINE SULFATE 50 MG/ML
10 INJECTION, SOLUTION INTRAVENOUS
Status: ACTIVE | OUTPATIENT
Start: 2018-06-26 | End: 2018-06-27

## 2018-06-26 RX ORDER — FENTANYL/BUPIVACAINE/NS/PF 2-1250MCG
10 PREFILLED PUMP RESERVOIR EPIDURAL CONTINUOUS
Status: DISCONTINUED | OUTPATIENT
Start: 2018-06-26 | End: 2018-06-27 | Stop reason: HOSPADM

## 2018-06-26 RX ADMIN — SODIUM CHLORIDE, SODIUM LACTATE, POTASSIUM CHLORIDE, AND CALCIUM CHLORIDE 125 ML/HR: 600; 310; 30; 20 INJECTION, SOLUTION INTRAVENOUS at 03:01

## 2018-06-26 RX ADMIN — PENICILLIN G POTASSIUM 2.5 MILLION UNITS: 20000000 POWDER, FOR SOLUTION INTRAVENOUS at 15:16

## 2018-06-26 RX ADMIN — PENICILLIN G POTASSIUM 2.5 MILLION UNITS: 20000000 POWDER, FOR SOLUTION INTRAVENOUS at 06:55

## 2018-06-26 RX ADMIN — SODIUM CHLORIDE, SODIUM LACTATE, POTASSIUM CHLORIDE, AND CALCIUM CHLORIDE 125 ML/HR: 600; 310; 30; 20 INJECTION, SOLUTION INTRAVENOUS at 21:29

## 2018-06-26 RX ADMIN — Medication 2 MILLI-UNITS/MIN: at 03:04

## 2018-06-26 RX ADMIN — SODIUM CHLORIDE, SODIUM LACTATE, POTASSIUM CHLORIDE, AND CALCIUM CHLORIDE 125 ML/HR: 600; 310; 30; 20 INJECTION, SOLUTION INTRAVENOUS at 13:55

## 2018-06-26 RX ADMIN — MISOPROSTOL 25 MCG: 100 TABLET ORAL at 20:03

## 2018-06-26 RX ADMIN — VALACYCLOVIR HYDROCHLORIDE 500 MG: 500 TABLET, FILM COATED ORAL at 08:57

## 2018-06-26 RX ADMIN — PENICILLIN G POTASSIUM 2.5 MILLION UNITS: 20000000 POWDER, FOR SOLUTION INTRAVENOUS at 03:02

## 2018-06-26 RX ADMIN — DOCUSATE SODIUM 100 MG: 100 CAPSULE, LIQUID FILLED ORAL at 08:58

## 2018-06-26 RX ADMIN — PENICILLIN G POTASSIUM 2.5 MILLION UNITS: 20000000 POWDER, FOR SOLUTION INTRAVENOUS at 19:40

## 2018-06-26 RX ADMIN — PENICILLIN G POTASSIUM 2.5 MILLION UNITS: 20000000 POWDER, FOR SOLUTION INTRAVENOUS at 10:55

## 2018-06-26 NOTE — PROGRESS NOTES
Labor Progress Note  Patient seen, fetal heart rate and contraction pattern evaluated, patient examined. Pitocin at 16 units. Pt rates pain 5/10. Patient Vitals for the past 2 hrs:   BP Temp Pulse Resp   06/26/18 1549 115/77 98.3 °F (36.8 °C) (!) 57 16       Physical Exam:  Cervical Exam:  1/50 %/ (high)/   Uterine Activity: Frequency: Every 2-5 minutes and Intensity: mild  Fetal Heart Rate: Reactive    Assessment/Plan:  Reassuring fetal status, Labor  Continue expectant management, Continue plan for vaginal delivery. Will stop pitocin now, allow washout period. Restart buccal cytotec.     Paresh Hodgson,

## 2018-06-26 NOTE — PROGRESS NOTES
Labor Progress Note  Patient seen, fetal heart rate and contraction pattern evaluated, patient examined. Pitocin turned off for about 20 min due to 3 late decelerations. Tracing now reactive, restarting pitocin at 12 units. Pt feeling slightly more uncomfortable. SROM at 11:10am.   Patient Vitals for the past 2 hrs:   BP Temp Pulse Resp   06/26/18 1210 109/60 98.2 °F (36.8 °C) (!) 57 16       Physical Exam:  Cervical Exam:  1.5/50 %/ (high)/ - unchanged  Uterine Activity: Frequency: Every 2-5 minutes and Intensity: mild  Fetal Heart Rate: Reactive    Assessment/Plan:  Reassuring fetal status, Labor  Not progressing normally  continue pitocin augmentation  titrating dosage safely, Continue plan for vaginal delivery. If still no response to pitocin at the next check, will consider another option for cervical ripening. Will try position changes at this time.     Lorenz Pallas, DO

## 2018-06-26 NOTE — PROGRESS NOTES
0800: Bedside and Verbal shift change report given to NESTOR Srinivasan RN (oncoming nurse) by GONZALO Perera RN (offgoing nurse). Report included the following information SBAR, Intake/Output and MAR.     0803: SVE 1 cm. Plan of care discussed, will consider castillo bulb if unchanged this afternoon. 6249: Patient in bath at this time. 1014: Pulse ox placed on patient. Sitting on birthing ball and leaning over, maternal HR tracing. 1110: SROM at this time for moderate amount of clear fluid. 1227: SVE 1.5/50/high. 1251: Walchers position. 1400: IV fluid bolus started. 1530: Bedside and Verbal shift change report given to FLORES Nunes RN (oncoming nurse) by NESTOR Srinivasan RN (offgoing nurse). Report included the following information SBAR, Intake/Output and MAR.

## 2018-06-26 NOTE — PROGRESS NOTES
Labor Progress Note  Patient seen, fetal heart rate and contraction pattern evaluated, patient examined. Pitocin at 16 units. Pt reports mild contractions. Patient Vitals for the past 2 hrs:   BP Temp Pulse Resp   06/26/18 0742 104/61 98.7 °F (37.1 °C) (!) 52 16       Physical Exam:  Cervical Exam:  1/50 %/ (high)/   Uterine Activity: Frequency: Every 5 minutes and Intensity: mild  Fetal Heart Rate: Reactive    Assessment/Plan:  Reassuring fetal status, Labor  Not progressing normally  continue pitocin augmentation  titrating dosage safely, Continue plan for vaginal delivery. If cervical exam unchanged in about 4 hours, will reconsider cook catheter for cervical ripening.      Salas Pham,

## 2018-06-26 NOTE — PROGRESS NOTES
Pt seen and examined. Having contractions every 7 minutes, but comfortable. No VB, no LOF. Just received fourth dose of cytotec    98.1,59,14,126/80  Chest: CTA  CV: S1S2 RRR  Abd; Gravid, NT, ND  EFW: 8#  SVE: 1/50/soft  [place]: Q7  FHT: reactive, Cat I      A/ 27yo G1 at 40 6/7 IOL for postdates with unfavorable cervix, GBS+ s/p cervidil and cytotec X4    P/ declines castillo bulb, will start pitocin 4 hours after last cytotec, and start PCN at that time.   Continue to monitor

## 2018-06-27 ENCOUNTER — ANESTHESIA EVENT (OUTPATIENT)
Dept: LABOR AND DELIVERY | Age: 28
End: 2018-06-27
Payer: OTHER GOVERNMENT

## 2018-06-27 ENCOUNTER — ANESTHESIA (OUTPATIENT)
Dept: LABOR AND DELIVERY | Age: 28
End: 2018-06-27
Payer: OTHER GOVERNMENT

## 2018-06-27 LAB
ABO + RH BLD: NORMAL
BLOOD GROUP ANTIBODIES SERPL: NORMAL
SPECIMEN EXP DATE BLD: NORMAL

## 2018-06-27 PROCEDURE — 75410000003 HC RECOV DEL/VAG/CSECN EA 0.5 HR: Performed by: OBSTETRICS & GYNECOLOGY

## 2018-06-27 PROCEDURE — 74011250636 HC RX REV CODE- 250/636: Performed by: OBSTETRICS & GYNECOLOGY

## 2018-06-27 PROCEDURE — 86900 BLOOD TYPING SEROLOGIC ABO: CPT | Performed by: OBSTETRICS & GYNECOLOGY

## 2018-06-27 PROCEDURE — 77030034849

## 2018-06-27 PROCEDURE — 77030012890

## 2018-06-27 PROCEDURE — 75410000002 HC LABOR FEE PER 1 HR

## 2018-06-27 PROCEDURE — 77030014125 HC TY EPDRL BBMI -B: Performed by: ANESTHESIOLOGY

## 2018-06-27 PROCEDURE — 74011000250 HC RX REV CODE- 250

## 2018-06-27 PROCEDURE — 74011250637 HC RX REV CODE- 250/637: Performed by: OBSTETRICS & GYNECOLOGY

## 2018-06-27 PROCEDURE — 36415 COLL VENOUS BLD VENIPUNCTURE: CPT | Performed by: OBSTETRICS & GYNECOLOGY

## 2018-06-27 PROCEDURE — 76010000391 HC C SECN FIRST 1 HR: Performed by: OBSTETRICS & GYNECOLOGY

## 2018-06-27 PROCEDURE — 76060000078 HC EPIDURAL ANESTHESIA: Performed by: OBSTETRICS & GYNECOLOGY

## 2018-06-27 PROCEDURE — 74011250636 HC RX REV CODE- 250/636

## 2018-06-27 PROCEDURE — 65270000029 HC RM PRIVATE

## 2018-06-27 PROCEDURE — 76010000392 HC C SECN EA ADDL 0.5 HR: Performed by: OBSTETRICS & GYNECOLOGY

## 2018-06-27 RX ORDER — ONDANSETRON 2 MG/ML
INJECTION INTRAMUSCULAR; INTRAVENOUS AS NEEDED
Status: DISCONTINUED | OUTPATIENT
Start: 2018-06-27 | End: 2018-06-27 | Stop reason: HOSPADM

## 2018-06-27 RX ORDER — SODIUM CHLORIDE 0.9 % (FLUSH) 0.9 %
5-10 SYRINGE (ML) INJECTION AS NEEDED
Status: DISCONTINUED | OUTPATIENT
Start: 2018-06-27 | End: 2018-06-28

## 2018-06-27 RX ORDER — SODIUM CHLORIDE, SODIUM LACTATE, POTASSIUM CHLORIDE, CALCIUM CHLORIDE 600; 310; 30; 20 MG/100ML; MG/100ML; MG/100ML; MG/100ML
INJECTION, SOLUTION INTRAVENOUS
Status: DISCONTINUED | OUTPATIENT
Start: 2018-06-27 | End: 2018-06-27 | Stop reason: HOSPADM

## 2018-06-27 RX ORDER — PHENYLEPHRINE 10 MG/250 ML(40 MCG/ML)IN 0.9 % SOD.CHLORIDE INTRAVENOUS
Status: DISCONTINUED
Start: 2018-06-27 | End: 2018-06-28

## 2018-06-27 RX ORDER — MORPHINE SULFATE 0.5 MG/ML
INJECTION, SOLUTION EPIDURAL; INTRATHECAL; INTRAVENOUS AS NEEDED
Status: DISCONTINUED | OUTPATIENT
Start: 2018-06-27 | End: 2018-06-27 | Stop reason: HOSPADM

## 2018-06-27 RX ORDER — CEFAZOLIN SODIUM/WATER 2 G/20 ML
2 SYRINGE (ML) INTRAVENOUS ONCE
Status: COMPLETED | OUTPATIENT
Start: 2018-06-27 | End: 2018-06-27

## 2018-06-27 RX ORDER — BUPIVACAINE HYDROCHLORIDE 7.5 MG/ML
INJECTION, SOLUTION EPIDURAL; RETROBULBAR AS NEEDED
Status: DISCONTINUED | OUTPATIENT
Start: 2018-06-27 | End: 2018-06-27 | Stop reason: HOSPADM

## 2018-06-27 RX ORDER — SODIUM CHLORIDE 0.9 % (FLUSH) 0.9 %
SYRINGE (ML) INJECTION
Status: COMPLETED
Start: 2018-06-27 | End: 2018-06-27

## 2018-06-27 RX ORDER — OXYTOCIN/RINGER'S LACTATE 20/1000 ML
PLASTIC BAG, INJECTION (ML) INTRAVENOUS
Status: DISCONTINUED | OUTPATIENT
Start: 2018-06-27 | End: 2018-06-27

## 2018-06-27 RX ORDER — OXYTOCIN/RINGER'S LACTATE 20/1000 ML
PLASTIC BAG, INJECTION (ML) INTRAVENOUS
Status: COMPLETED
Start: 2018-06-27 | End: 2018-06-27

## 2018-06-27 RX ORDER — EPHEDRINE SULFATE 50 MG/ML
INJECTION, SOLUTION INTRAVENOUS AS NEEDED
Status: DISCONTINUED | OUTPATIENT
Start: 2018-06-27 | End: 2018-06-27 | Stop reason: HOSPADM

## 2018-06-27 RX ORDER — OXYTOCIN/RINGER'S LACTATE 20/1000 ML
125-1000 PLASTIC BAG, INJECTION (ML) INTRAVENOUS
Status: DISCONTINUED | OUTPATIENT
Start: 2018-06-27 | End: 2018-06-27 | Stop reason: HOSPADM

## 2018-06-27 RX ORDER — OXYTOCIN/RINGER'S LACTATE 20/1000 ML
PLASTIC BAG, INJECTION (ML) INTRAVENOUS
Status: DISCONTINUED | OUTPATIENT
Start: 2018-06-27 | End: 2018-06-27 | Stop reason: HOSPADM

## 2018-06-27 RX ADMIN — EPHEDRINE SULFATE 10 MG: 50 INJECTION, SOLUTION INTRAVENOUS at 22:43

## 2018-06-27 RX ADMIN — Medication 2 MILLI-UNITS/MIN: at 08:51

## 2018-06-27 RX ADMIN — MISOPROSTOL 25 MCG: 100 TABLET ORAL at 03:58

## 2018-06-27 RX ADMIN — EPHEDRINE SULFATE 10 MG: 50 INJECTION, SOLUTION INTRAVENOUS at 22:41

## 2018-06-27 RX ADMIN — Medication 2 G: at 22:05

## 2018-06-27 RX ADMIN — MISOPROSTOL 25 MCG: 100 TABLET ORAL at 00:11

## 2018-06-27 RX ADMIN — BUPIVACAINE HYDROCHLORIDE 13 MG: 7.5 INJECTION, SOLUTION EPIDURAL; RETROBULBAR at 22:28

## 2018-06-27 RX ADMIN — DOCUSATE SODIUM 100 MG: 100 CAPSULE, LIQUID FILLED ORAL at 11:05

## 2018-06-27 RX ADMIN — MORPHINE SULFATE 500 MCG: 0.5 INJECTION, SOLUTION EPIDURAL; INTRATHECAL; INTRAVENOUS at 22:28

## 2018-06-27 RX ADMIN — Medication 20 UNITS/HR: at 22:49

## 2018-06-27 RX ADMIN — EPHEDRINE SULFATE 10 MG: 50 INJECTION, SOLUTION INTRAVENOUS at 22:35

## 2018-06-27 RX ADMIN — SODIUM CHLORIDE, SODIUM LACTATE, POTASSIUM CHLORIDE, CALCIUM CHLORIDE: 600; 310; 30; 20 INJECTION, SOLUTION INTRAVENOUS at 22:15

## 2018-06-27 RX ADMIN — Medication 10 ML: at 08:04

## 2018-06-27 RX ADMIN — SODIUM CHLORIDE, SODIUM LACTATE, POTASSIUM CHLORIDE, AND CALCIUM CHLORIDE 125 ML/HR: 600; 310; 30; 20 INJECTION, SOLUTION INTRAVENOUS at 23:48

## 2018-06-27 RX ADMIN — MORPHINE SULFATE 500 MCG: 0.5 INJECTION, SOLUTION EPIDURAL; INTRATHECAL; INTRAVENOUS at 22:34

## 2018-06-27 RX ADMIN — PENICILLIN G POTASSIUM 2.5 MILLION UNITS: 20000000 POWDER, FOR SOLUTION INTRAVENOUS at 13:00

## 2018-06-27 RX ADMIN — ONDANSETRON 4 MG: 2 INJECTION INTRAMUSCULAR; INTRAVENOUS at 22:53

## 2018-06-27 RX ADMIN — PENICILLIN G POTASSIUM 2.5 MILLION UNITS: 20000000 POWDER, FOR SOLUTION INTRAVENOUS at 17:17

## 2018-06-27 RX ADMIN — VALACYCLOVIR HYDROCHLORIDE 500 MG: 500 TABLET, FILM COATED ORAL at 07:55

## 2018-06-27 RX ADMIN — PENICILLIN G POTASSIUM 2.5 MILLION UNITS: 20000000 POWDER, FOR SOLUTION INTRAVENOUS at 08:56

## 2018-06-27 RX ADMIN — BUPIVACAINE HYDROCHLORIDE 13 MG: 7.5 INJECTION, SOLUTION EPIDURAL; RETROBULBAR at 22:34

## 2018-06-27 RX ADMIN — PENICILLIN G POTASSIUM 2.5 MILLION UNITS: 20000000 POWDER, FOR SOLUTION INTRAVENOUS at 20:58

## 2018-06-27 NOTE — PROGRESS NOTES
1535 Bedside and Verbal shift change report given to Sonali Tinsley RN (oncoming nurse) by Kris Hobson RN (offgoing nurse). Report included the following information SBAR, MAR and Recent Results. 1545 Pt on birthing ball and rocking with contractions. Able to talk during them. [address] Hawk Mcbride at bedside to assess. Danii Mcbride at bedside to discuss option of c/s.    1919 Pt has decided to proceed with a c/s.     0050 MIU called and given 30 minute warning. 0115 Pt feeling cold. Oral temp was 96.6. Applied warm blankets. 0140 Pt still cold. Added to the solis hugger to help warm her up. Oral temp 97.4.    0220 MIU called and given 5 minute warning.    0227 Pt transferred to MIU in stable condition via hospital bed.    0239 TRANSFER - OUT REPORT:    Verbal report given to Breana Eden RN(name) on Massimo Villanueva  being transferred to MIU(unit) for routine post - op       Report consisted of patients Situation, Background, Assessment and   Recommendations(SBAR). Information from the following report(s) SBAR, OR Summary, MAR and Recent Results was reviewed with the receiving nurse. Lines:   Peripheral IV 06/26/18 Right Wrist (Active)   Site Assessment Clean, dry, & intact 6/27/2018  7:59 AM   Phlebitis Assessment 0 6/27/2018  7:59 AM   Infiltration Assessment 0 6/27/2018  7:59 AM   Dressing Status Clean, dry, & intact 6/27/2018  7:59 AM   Dressing Type Tape;Transparent 6/27/2018  7:59 AM   Hub Color/Line Status Pink 6/27/2018  7:59 AM   Alcohol Cap Used Yes 6/27/2018  7:59 AM        Opportunity for questions and clarification was provided.       Patient transported with:   Registered Nurse

## 2018-06-27 NOTE — PROGRESS NOTES
0000 Verbal shift change report given to Ruth Florence RN (oncoming nurse) by Nydia Schaeffer RN (offgoing nurse). Report included the following information SBAR, Kardex, Intake/Output, MAR, Accordion, Recent Results and Med Rec Status. 0750 Bedside and Verbal shift change report given to CHELY Wilkinson RN (oncoming nurse) by Meera RN (offgoing nurse). Report included the following information SBAR, Kardex, Intake/Output, MAR, Accordion, Recent Results and Med Rec Status.

## 2018-06-27 NOTE — PROGRESS NOTES
1530 Bedside report received from NESTOR Leigh RN. History, allergies, medications, and plan of care reviewed. Patient resting in the bed. No complaints at this time. Will continue to monitor. 0000 Verbal report given to VASHTI Henry RN. History, allergies, medications, and plan of care reviewed. Patient resting in the bed.

## 2018-06-27 NOTE — PROGRESS NOTES
Labor Progress Note  Patient called me back into the room to discuss  for delivery. Pt is very disappointed that she is still only 1 cm despite all the medications that have been tried. She has been in a good pattern of labor all day. I advised pt that we can proceed with  but that if she is just starting to get uncomfortable with the contractions this might be the start of true labor and a cervical exam in 4-6 hours from the time the contractions picked up would be a good time to re-evaluate. Pt declines further induction and requests to proceed with . I advised pt that we will proceed once the OR is available. Questions/concerns addressed.

## 2018-06-27 NOTE — PROGRESS NOTES
Labor Progress Note  Patient seen, fetal heart rate and contraction pattern evaluated, patient examined. Pt reports a large gush of fluid about an hour ago. Contractions became much more uncomfortable about 2 hours ago. Patient Vitals for the past 1 hrs:   BP Pulse   18 1800 129/84 (!) 55       Physical Exam:  Cervical Exam:  1 cm dilated    60% effaced    -3 station    Membranes:  clear with light pink tinge to pad  Uterine Activity: Frequency: Every 2-3 minutes on pit  Fetal Heart Rate: Reactive    Assessment/Plan:  32 y/o G1 at 39 1/7 weeks here for induction of labor for postdates. s/p cervidil-->cytotec-->pit-->SROM at 11am -->cyototec-->pit today with recent large gush of fluid. No significant cervical change but pt now getting uncomfortable. Category I tracing. Afebrile. Offered  for failed induction vs continue pitocin induction as seems that she is just getting into labor. Pt desires to continue with pitocin for now. Continue to titrate to adequate labor.

## 2018-06-27 NOTE — PROGRESS NOTES
Labor Progress Note  Patient seen, fetal heart rate and contraction pattern evaluated, patient examined. Visit Vitals    /60    Pulse 65    Temp 98.1 °F (36.7 °C)    Resp 14    Ht 5' 2\" (1.575 m)    Wt 77.6 kg (171 lb)    Breastfeeding Yes    BMI 31.28 kg/m2         Physical Exam:  Cervical Exam:  1/50/-3  Membranes:  +ROM; no fluid at time of exam now  Uterine Activity: ctx every 10 minutes  Fetal Heart Rate: 150s mod +accels no decels    Assessment/Plan:  Postdates iol - not progressing. Offered primary CS vs restarting pitocin. Will keep NPO and reassess progression at lunch. FHT reassuring. No s/sx of chorio. Restart PNC for GBS positive.

## 2018-06-27 NOTE — PROGRESS NOTES
Pt seen and examined. Feeling comfortable.   +LOF, clear, no VB, good FM.      98.5,71,16,104/72  Chest: CTA  CV: S1S2 RRR  Abd: gravid, non tender non distended  [place]: irregular, rare  SVE: def    A/ G1 at 41 weeks IOL for post dates, now SROM at 1100 on 6/26, on oral cytotec    P/ PCN and Pit after 4 doses of cytotec tonight

## 2018-06-27 NOTE — PROGRESS NOTES
Labor Progress Note  Patient seen, fetal heart rate and contraction pattern evaluated, patient examined. Starting to feel more ctx pain than previously. Visit Vitals    /75 (BP 1 Location: Left arm, BP Patient Position: At rest;Sitting)    Pulse 74    Temp 98.3 °F (36.8 °C)    Resp 16    Ht 5' 2\" (1.575 m)    Wt 77.6 kg (171 lb)    Breastfeeding Yes    BMI 31.28 kg/m2         Physical Exam:  Cervical Exam:  deferred  Membranes:  +ROM; pt reports continued lof  Uterine Activity: ctx every 2, pit at 15  Fetal Heart Rate: 140s mod +accels no decels    Assessment/Plan:  Postdates IOL, now with prolonged ROM > 24 hours. Continue pitocin. FHT reassuring. Continue penicillin for GBS.

## 2018-06-27 NOTE — PROGRESS NOTES
0750 Bedside and Verbal shift change report given to Nabeel Pérez  (oncoming nurse) by Mamadou Lugo RN (offgoing nurse). Report included the following information SBAR, Procedure Summary, Intake/Output, MAR and Recent Results. 0830 Dr. Domo Ordonez at bedside. Reviewed fetal monitor strip. SVE 1/50 - no change. Discussing plan of care. Etienne to restart pitocin. All Questions addressed at this time. 1535 Bedside and Verbal shift change report given to Camryn Machado RN  (oncoming nurse) by Jovan Kathleen RN  (offgoing nurse). Report included the following information SBAR, Intake/Output, MAR and Recent Results.

## 2018-06-28 LAB
BASOPHILS # BLD: 0 K/UL (ref 0–0.1)
BASOPHILS NFR BLD: 0 % (ref 0–1)
DIFFERENTIAL METHOD BLD: ABNORMAL
EOSINOPHIL # BLD: 0 K/UL (ref 0–0.4)
EOSINOPHIL NFR BLD: 0 % (ref 0–7)
ERYTHROCYTE [DISTWIDTH] IN BLOOD BY AUTOMATED COUNT: 16.4 % (ref 11.5–14.5)
HCT VFR BLD AUTO: 31.6 % (ref 35–47)
HGB BLD-MCNC: 10.4 G/DL (ref 11.5–16)
IMM GRANULOCYTES # BLD: 0.1 K/UL (ref 0–0.04)
IMM GRANULOCYTES NFR BLD AUTO: 1 % (ref 0–0.5)
LYMPHOCYTES # BLD: 1.3 K/UL (ref 0.8–3.5)
LYMPHOCYTES NFR BLD: 10 % (ref 12–49)
MCH RBC QN AUTO: 29.5 PG (ref 26–34)
MCHC RBC AUTO-ENTMCNC: 32.9 G/DL (ref 30–36.5)
MCV RBC AUTO: 89.5 FL (ref 80–99)
MONOCYTES # BLD: 0.8 K/UL (ref 0–1)
MONOCYTES NFR BLD: 6 % (ref 5–13)
NEUTS SEG # BLD: 11.2 K/UL (ref 1.8–8)
NEUTS SEG NFR BLD: 84 % (ref 32–75)
NRBC # BLD: 0 K/UL (ref 0–0.01)
NRBC BLD-RTO: 0 PER 100 WBC
PLATELET # BLD AUTO: 185 K/UL (ref 150–400)
PMV BLD AUTO: 11.4 FL (ref 8.9–12.9)
RBC # BLD AUTO: 3.53 M/UL (ref 3.8–5.2)
WBC # BLD AUTO: 13.3 K/UL (ref 3.6–11)

## 2018-06-28 PROCEDURE — 77030031139 HC SUT VCRL2 J&J -A

## 2018-06-28 PROCEDURE — 36415 COLL VENOUS BLD VENIPUNCTURE: CPT | Performed by: OBSTETRICS & GYNECOLOGY

## 2018-06-28 PROCEDURE — 77030032490 HC SLV COMPR SCD KNE COVD -B

## 2018-06-28 PROCEDURE — 85025 COMPLETE CBC W/AUTO DIFF WBC: CPT | Performed by: OBSTETRICS & GYNECOLOGY

## 2018-06-28 PROCEDURE — 65410000002 HC RM PRIVATE OB

## 2018-06-28 PROCEDURE — 74011250636 HC RX REV CODE- 250/636: Performed by: OBSTETRICS & GYNECOLOGY

## 2018-06-28 PROCEDURE — 77030032060 HC PWDR HEMSTAT ARISTA ASRB 3GM BARD -C

## 2018-06-28 PROCEDURE — 77030002974 HC SUT PLN J&J -A

## 2018-06-28 PROCEDURE — 77030011640 HC PAD GRND REM COVD -A

## 2018-06-28 PROCEDURE — 77030011255 HC DSG AQUACEL AG BMS -A

## 2018-06-28 PROCEDURE — 77030002933 HC SUT MCRYL J&J -A

## 2018-06-28 PROCEDURE — 77030011220 HC DEV ELECSURG COVD -B

## 2018-06-28 PROCEDURE — 74011000250 HC RX REV CODE- 250: Performed by: OBSTETRICS & GYNECOLOGY

## 2018-06-28 PROCEDURE — 74011250636 HC RX REV CODE- 250/636: Performed by: ANESTHESIOLOGY

## 2018-06-28 PROCEDURE — 77030018836 HC SOL IRR NACL ICUM -A

## 2018-06-28 PROCEDURE — 77030018846 HC SOL IRR STRL H20 ICUM -A

## 2018-06-28 RX ORDER — DEXTROSE, SODIUM CHLORIDE, SODIUM LACTATE, POTASSIUM CHLORIDE, AND CALCIUM CHLORIDE 5; .6; .31; .03; .02 G/100ML; G/100ML; G/100ML; G/100ML; G/100ML
125 INJECTION, SOLUTION INTRAVENOUS CONTINUOUS
Status: DISCONTINUED | OUTPATIENT
Start: 2018-06-28 | End: 2018-06-30 | Stop reason: HOSPADM

## 2018-06-28 RX ORDER — IBUPROFEN 400 MG/1
800 TABLET ORAL EVERY 8 HOURS
Status: DISCONTINUED | OUTPATIENT
Start: 2018-06-28 | End: 2018-06-30 | Stop reason: HOSPADM

## 2018-06-28 RX ORDER — ACETAMINOPHEN 325 MG/1
650 TABLET ORAL
Status: DISCONTINUED | OUTPATIENT
Start: 2018-06-28 | End: 2018-06-30 | Stop reason: HOSPADM

## 2018-06-28 RX ORDER — MORPHINE SULFATE 10 MG/ML
6 INJECTION, SOLUTION INTRAMUSCULAR; INTRAVENOUS
Status: DISCONTINUED | OUTPATIENT
Start: 2018-06-28 | End: 2018-06-30 | Stop reason: HOSPADM

## 2018-06-28 RX ORDER — OXYCODONE AND ACETAMINOPHEN 5; 325 MG/1; MG/1
1 TABLET ORAL
Status: DISCONTINUED | OUTPATIENT
Start: 2018-06-28 | End: 2018-06-30 | Stop reason: HOSPADM

## 2018-06-28 RX ORDER — OXYCODONE AND ACETAMINOPHEN 5; 325 MG/1; MG/1
2 TABLET ORAL
Status: DISCONTINUED | OUTPATIENT
Start: 2018-06-28 | End: 2018-06-30 | Stop reason: HOSPADM

## 2018-06-28 RX ORDER — ALBUTEROL SULFATE 0.83 MG/ML
2.5 SOLUTION RESPIRATORY (INHALATION)
Status: DISCONTINUED | OUTPATIENT
Start: 2018-06-28 | End: 2018-06-30 | Stop reason: HOSPADM

## 2018-06-28 RX ORDER — NALBUPHINE HYDROCHLORIDE 10 MG/ML
5 INJECTION, SOLUTION INTRAMUSCULAR; INTRAVENOUS; SUBCUTANEOUS
Status: DISCONTINUED | OUTPATIENT
Start: 2018-06-28 | End: 2018-06-30 | Stop reason: HOSPADM

## 2018-06-28 RX ORDER — DIPHENHYDRAMINE HYDROCHLORIDE 50 MG/ML
12.5 INJECTION, SOLUTION INTRAMUSCULAR; INTRAVENOUS
Status: DISCONTINUED | OUTPATIENT
Start: 2018-06-28 | End: 2018-06-30 | Stop reason: HOSPADM

## 2018-06-28 RX ORDER — NALOXONE HYDROCHLORIDE 0.4 MG/ML
0.4 INJECTION, SOLUTION INTRAMUSCULAR; INTRAVENOUS; SUBCUTANEOUS AS NEEDED
Status: DISCONTINUED | OUTPATIENT
Start: 2018-06-28 | End: 2018-06-30 | Stop reason: HOSPADM

## 2018-06-28 RX ORDER — SODIUM CHLORIDE 0.9 % (FLUSH) 0.9 %
5-10 SYRINGE (ML) INJECTION AS NEEDED
Status: DISCONTINUED | OUTPATIENT
Start: 2018-06-28 | End: 2018-06-30 | Stop reason: HOSPADM

## 2018-06-28 RX ORDER — KETOROLAC TROMETHAMINE 30 MG/ML
30 INJECTION, SOLUTION INTRAMUSCULAR; INTRAVENOUS
Status: DISPENSED | OUTPATIENT
Start: 2018-06-28 | End: 2018-06-29

## 2018-06-28 RX ORDER — OXYTOCIN/RINGER'S LACTATE 20/1000 ML
125-1000 PLASTIC BAG, INJECTION (ML) INTRAVENOUS AS NEEDED
Status: DISCONTINUED | OUTPATIENT
Start: 2018-06-28 | End: 2018-06-30 | Stop reason: HOSPADM

## 2018-06-28 RX ORDER — SIMETHICONE 80 MG
80 TABLET,CHEWABLE ORAL
Status: DISCONTINUED | OUTPATIENT
Start: 2018-06-28 | End: 2018-06-30 | Stop reason: HOSPADM

## 2018-06-28 RX ORDER — SODIUM CHLORIDE 0.9 % (FLUSH) 0.9 %
5-10 SYRINGE (ML) INJECTION EVERY 8 HOURS
Status: DISCONTINUED | OUTPATIENT
Start: 2018-06-28 | End: 2018-06-30 | Stop reason: HOSPADM

## 2018-06-28 RX ORDER — NALBUPHINE HYDROCHLORIDE 10 MG/ML
2.5 INJECTION, SOLUTION INTRAMUSCULAR; INTRAVENOUS; SUBCUTANEOUS
Status: DISCONTINUED | OUTPATIENT
Start: 2018-06-28 | End: 2018-06-30 | Stop reason: HOSPADM

## 2018-06-28 RX ORDER — PROCHLORPERAZINE EDISYLATE 5 MG/ML
10 INJECTION INTRAMUSCULAR; INTRAVENOUS
Status: DISCONTINUED | OUTPATIENT
Start: 2018-06-28 | End: 2018-06-28 | Stop reason: CLARIF

## 2018-06-28 RX ORDER — MORPHINE SULFATE 10 MG/ML
10 INJECTION, SOLUTION INTRAMUSCULAR; INTRAVENOUS
Status: DISCONTINUED | OUTPATIENT
Start: 2018-06-28 | End: 2018-06-30 | Stop reason: HOSPADM

## 2018-06-28 RX ORDER — ONDANSETRON 2 MG/ML
4 INJECTION INTRAMUSCULAR; INTRAVENOUS
Status: DISCONTINUED | OUTPATIENT
Start: 2018-06-28 | End: 2018-06-30 | Stop reason: HOSPADM

## 2018-06-28 RX ORDER — DOCUSATE SODIUM 100 MG/1
100 CAPSULE, LIQUID FILLED ORAL
Status: DISCONTINUED | OUTPATIENT
Start: 2018-06-28 | End: 2018-06-30 | Stop reason: HOSPADM

## 2018-06-28 RX ORDER — AMMONIA 15 % (W/V)
1 AMPUL (EA) INHALATION AS NEEDED
Status: DISCONTINUED | OUTPATIENT
Start: 2018-06-28 | End: 2018-06-30 | Stop reason: HOSPADM

## 2018-06-28 RX ORDER — HYDROCORTISONE 1 %
CREAM (GRAM) TOPICAL AS NEEDED
Status: DISCONTINUED | OUTPATIENT
Start: 2018-06-28 | End: 2018-06-30 | Stop reason: HOSPADM

## 2018-06-28 RX ADMIN — KETOROLAC TROMETHAMINE 30 MG: 30 INJECTION INTRAMUSCULAR; INTRAVENOUS at 20:30

## 2018-06-28 RX ADMIN — SODIUM CHLORIDE 10 MG: 9 INJECTION INTRAMUSCULAR; INTRAVENOUS; SUBCUTANEOUS at 04:11

## 2018-06-28 RX ADMIN — SODIUM CHLORIDE 10 MG: 9 INJECTION INTRAMUSCULAR; INTRAVENOUS; SUBCUTANEOUS at 11:27

## 2018-06-28 NOTE — OP NOTES
Section Delivery Operative Report     Date of Surgery: 2018     Preoperative Diagnosis: 41 weeks, failed induction    Postoperative Diagnosis: same, delivered    Procedure: Procedure(s):   SECTION-Primary [facility]    Surgeon(s) and Role:     * Chayo Pastor DO - Primary     * Nadeem Cody MD - Assisting     Anesthesia: Spinal    EBL: 800cc    Findings:  LBMI in vertex presentation, ROT with nuchal x 1. Apgars 9,9. Weight pending. Normal maternal anatomy. Procedure Detail:    The patient was taken to the operating room, where spinal anesthesia was found to be adequate. The patient was prepped and draped in the normal sterile fashion. Pfannenstiel skin incision was made with the scalpel and carried down to the underlying fascia. The fascial incision was extended laterally with Flores scissors. This fascial incision was grasped with Kocher clamps, tented up, and the underlying rectus muscles were dissected sharply. The inferior edge of the rectus fascia was grasped with Kocher clamps, tented up, and the underlying rectus muscle was dissected off sharply. The rectus muscle was divided in the midline bluntly. The peritoneum was entered bluntly and extended inferiorly and superiorly with stretch. The bladder blade was then inserted. The vesicouterine peritoneum was identified, grasped with pick-ups and entered sharply with Metzenbaum scissors. The bladder flap was then created digitally and the bladder blade was reinserted. A low transverse uterine incision was made with the scalpel and extended laterally with blunt finger dissection. The babys head was then delivered atraumatically. The nose and mouth were suctioned. The cord was clamped and cut and the baby was handed off to the waiting nursing staff. Placenta was then delivered spontaneously. The uterus was exteriorized and cleared of all clots and debris. The uterine incision was closed in two layers.  The first layer with running locked layer of 0 Monocryl. The second layer was an imbricating layer of 0 Monocryl with good hemostasis assured. The pelvis was washed with warm normal saline and the uterus was returned to the abdomen. Good hemostasis was again reassured. The paracolic gutters were washed with warm normal saline and cleared of all clots and debris. Good hemostasis was again reassured throughout. Seprafilm was placed over the uterine incision and the peritoneum was closed with 2-0 Vicryl in a running fashion. The fascia was closed with #1 Vicryl in a running fashion. Good hemostasis was assured. The subcuticular layers were reapproximated with 2-0 plain gut in interrupted fashion. The skin was closed with a 4-0 Monocryl in a subcuticular fashion. The patient tolerated the procedure well. Sponge, lap, and needle counts were correct times two and the patient was taken to recovery in stable condition.       Specimens:   ID Type Source Tests Collected by Time Destination   1 : placenta Placenta Uterus  Stan Sarmiento DO 2018 2322 Discarded        Cord Blood Results:  Information for the patient's :  Faye Mendoza [[id number]]   No results found for: PCTABR, PCTDIG, BILI, ABORH    No results found for: APH, APCO2, APO2, AHCO3, ABEC, ABDC, O2ST, SITE, RSCOM     Prenatal Labs:  Lab Results   Component Value Date/Time    ABO/Rh(D) A POSITIVE 2018 10:09 PM    HBsAg, External negative 2018    HIV, External non reactive 2018    Rubella, External immune 2017    RPR, External non reactive 2018    Gonorrhea, External negative 2017    Chlamydia, External negative 2017    GrBStrep, External positive 2018        Signed By:  Stan Sarmiento DO     2018

## 2018-06-28 NOTE — ANESTHESIA PROCEDURE NOTES
Spinal Block    Performed by: Jossy Cummings  Authorized by: Jossy Cummings     Pre-procedure:   Indications: primary anesthetic  Preanesthetic Checklist: patient identified, risks and benefits discussed, anesthesia consent, site marked, patient being monitored and timeout performed      Spinal Block:   Patient Position:  Seated    Prep: chlorhexidine      Location:  L3-4  Technique:  Single shot  Local:  Lidocaine 1%  Local Dose (mL):  3    Needle:   Needle Type:  Pencil-tip  Needle Gauge:  25 G  Attempts:  1      Events: CSF confirmed, no blood with aspiration and no paresthesia        Assessment:  Insertion:  Uncomplicated  Patient tolerance:  Patient tolerated the procedure well with no immediate complications  T4 level obtained

## 2018-06-28 NOTE — ANESTHESIA POSTPROCEDURE EVALUATION
Post-Anesthesia Evaluation and Assessment    Patient: Inderjit Rushing MRN: [id number]  SSN: [id number]    YOB: 1990  Age: 33 y.o. Sex: female       Cardiovascular Function/Vital Signs  Visit Vitals    /63 (BP 1 Location: Right arm, BP Patient Position: At rest)    Pulse 86    Temp 36.5 °C (97.7 °F)    Resp 16    Ht 5' 2\" (1.575 m)    Wt 77.6 kg (171 lb)    SpO2 92%    Breastfeeding Yes    BMI 31.28 kg/m2       Patient is status post spinal anesthesia for Procedure(s):   SECTION. Nausea/Vomiting: None    Postoperative hydration reviewed and adequate. Pain:  Pain Scale 1: Numeric (0 - 10) (18 0230)  Pain Intensity 1: 0 (18 023)   Managed    Neurological Status:   Neuro (WDL): Within Defined Limits (18 2332)   At baseline    Mental Status and Level of Consciousness: Alert and oriented     Pulmonary Status:   O2 Device: Room air (18 0230)   Adequate oxygenation and airway patent    Complications related to anesthesia: None    Post-anesthesia assessment completed.  No concerns    Signed By: Adrian Andrews DO     2018

## 2018-06-28 NOTE — PROGRESS NOTES
Post-Operative Day Number 1 Progress Note    Patient doing well post-op day 1 from  delivery without significant complaints. Pain controlled on current medication. Adequate urine output-castillo still in place, normal lochia. No flatus yet. N/V overnight. Denies cp/sob. Vitals:  Patient Vitals for the past 8 hrs:   BP Temp Pulse Resp SpO2   18 0645 111/70 97.8 °F (36.6 °C) 62 18 -   18 0230 104/63 97.7 °F (36.5 °C) 86 16 -   18 0214 - 98.1 °F (36.7 °C) - - -   18 0202 109/56 - 73 - 92 %   18 0147 121/59 - 74 - (!) 89 %   18 0140 - 97.4 °F (36.3 °C) - - -   18 0133 119/68 - 71 - 93 %   18 0117 112/54 - 72 - 98 %   18 0115 - 96.6 °F (35.9 °C) - - -   18 0102 123/71 - 75 - 99 %   18 0047 123/67 - 70 - 98 %   18 0032 131/70 - 68 - 99 %   18 0017 129/62 - 76 - 99 %   18 0002 124/62 - 70 - 99 %   18 2347 116/66 - 69 - 96 %     Temp (24hrs), Av.9 °F (36.6 °C), Min:96.6 °F (35.9 °C), Max:98.6 °F (37 °C)      Vital signs stable, afebrile. Exam:  Patient without distress. Heart regular rate and rhythm   Lungs CTA b/l               Abdomen soft, fundus firm at level of umbilicus, non tender. Bandage dry and clean without surrouding erythema. Lower extremities are negative for swelling, cords or tenderness. Lab/Data Review:  CBC pending     Assessment and Plan:  Patient appears to be having uncomplicated post- course. Continue routine post-op care and maternal education. Boy-desires circ-consented. Benign labs.

## 2018-06-28 NOTE — PROGRESS NOTES
Report received from LUDWIN Espinoza RN using SBAR.    1100:  IV saline-locked; SCD's and TIMOTHY hose removed; Albrecht cath dc'd; pt was initally dizzy while sitting on side of bed but it subsided and she ambulated to bathroom without difficulty; joão-care completed; DTV by 1700.  1434:  Pt ambulated to BR w/assist; voided well; check void by 2030.  1450:  Check void complete.

## 2018-06-28 NOTE — ANESTHESIA PREPROCEDURE EVALUATION
Anesthetic History   No history of anesthetic complications            Review of Systems / Medical History  Patient summary reviewed, nursing notes reviewed and pertinent labs reviewed    Pulmonary            Asthma     Comments: Smoking Status: Former Smoker      Quit Smokin17     Neuro/Psych         Psychiatric history     Cardiovascular  Within defined limits                Exercise tolerance: >4 METS     GI/Hepatic/Renal  Within defined limits              Endo/Other  Within defined limits           Other Findings              Physical Exam    Airway  Mallampati: II  TM Distance: 4 - 6 cm  Neck ROM: normal range of motion   Mouth opening: Normal     Cardiovascular  Regular rate and rhythm,  S1 and S2 normal,  no murmur, click, rub, or gallop  Rhythm: regular  Rate: normal         Dental  No notable dental hx       Pulmonary  Breath sounds clear to auscultation               Abdominal  GI exam deferred       Other Findings            Anesthetic Plan    ASA: 2  Anesthesia type: spinal            Anesthetic plan and risks discussed with: Patient, Spouse and Family

## 2018-06-28 NOTE — BRIEF OP NOTE
BRIEF OPERATIVE NOTE    Date of Procedure: 2018   Preoperative Diagnosis: 39 weeks, failed induction  Postoperative Diagnosis: same, delivered    Procedure(s):   SECTION  Surgeon(s) and Role:     * Susi Holstein, DO - Primary     * Chepe Clark MD - Assisting         Surgical Staff:  Circ-1: Soco Gregory RN  Scrub Tech-1: Charles Espinoza CNA  Float Staff: Sendy Tena RN  Event Time In   Incision Start 2242   Incision Close 2318     Anesthesia: Epidural   Estimated Blood Loss: 800cc  Specimens:   ID Type Source Tests Collected by Time Destination   1 : placenta Placenta Uterus  Joaniejonathon ZarateHolstein, DO 2018 2322 Discarded      Findings: Normal maternal anatomy. LBMI in vertex, ROT presentation. Nuchal x 1.  Apgars 9,9   Complications: None  Implants: * No implants in log *

## 2018-06-28 NOTE — ROUTINE PROCESS
TRANSFER - IN REPORT:    Verbal report received from JEAN-CLAUDE Michelle (name) on Tammi Flood  being received from [facility] (unit) for routine progression of care      Report consisted of patients Situation, Background, Assessment and   Recommendations(SBAR). Information from the following report(s) SBAR and MAR was reviewed with the receiving nurse. Opportunity for questions and clarification was provided. Assessment completed upon patients arrival to unit and care assumed.

## 2018-06-28 NOTE — LACTATION NOTE
This note was copied from a baby's chart. Initial lactation consultation- I made initial visit to G1 mother whose infant was born by C/S last night. Infant has already nursed 3 times and is now nursing a fourth time. Infant is very alert. Mother has asthma for which she takes Albuterol and also said she is on Valtrex. Feeding Plan: Mother will keep baby skin to skin as often as possible, feed on demand, respond to feeding cues, obtain latch, listen for audible swallowing, be aware of signs of oxytocin release/ cramping,thrist,sleepyness while breastfeeding, offer both breasts,and will not limit feedings.  behaviors reviewed, May be very sleepy in first 24 hours, mother should keep infant skin to skin and may need to wake baby for some feedings, and offer hand expressed drops, hand expression of colostrum 8-12 times a day if baby is not waking will give needed stimulation to breast and provide colostrum to sleepy baby,  After baby wakes from deep sleep usually on the 2nd or 3rd day a new behavior pattern follows. Frequent feeding during this brief behavioral phase preceeding milk transition is called cluster feeding. Typical  behavior: baby becomes vigorous at the breast and wants to feed frequently- every 1-2 hours for several feedings. Emptying of the breast twice produces double in subsequent feedings. This is the normal process by which the baby demands his/her supply. This type of frequent feeding is the stimulation which causes lactogenesis II (milk coming in).

## 2018-06-29 PROCEDURE — 65410000002 HC RM PRIVATE OB

## 2018-06-29 PROCEDURE — 74011250637 HC RX REV CODE- 250/637: Performed by: OBSTETRICS & GYNECOLOGY

## 2018-06-29 RX ORDER — OXYCODONE AND ACETAMINOPHEN 5; 325 MG/1; MG/1
1 TABLET ORAL
Qty: 20 TAB | Refills: 0 | Status: ON HOLD | OUTPATIENT
Start: 2018-06-29 | End: 2021-01-02 | Stop reason: SDUPTHER

## 2018-06-29 RX ORDER — IBUPROFEN 800 MG/1
800 TABLET ORAL
Qty: 30 TAB | Refills: 1 | Status: ON HOLD | OUTPATIENT
Start: 2018-06-29 | End: 2021-01-02 | Stop reason: SDUPTHER

## 2018-06-29 RX ADMIN — OXYCODONE HYDROCHLORIDE AND ACETAMINOPHEN 2 TABLET: 5; 325 TABLET ORAL at 00:53

## 2018-06-29 RX ADMIN — SIMETHICONE CHEW TAB 80 MG 80 MG: 80 TABLET ORAL at 00:53

## 2018-06-29 RX ADMIN — OXYCODONE HYDROCHLORIDE AND ACETAMINOPHEN 1 TABLET: 5; 325 TABLET ORAL at 16:01

## 2018-06-29 RX ADMIN — SIMETHICONE CHEW TAB 80 MG 80 MG: 80 TABLET ORAL at 08:44

## 2018-06-29 RX ADMIN — IBUPROFEN 800 MG: 400 TABLET ORAL at 17:25

## 2018-06-29 RX ADMIN — SIMETHICONE CHEW TAB 80 MG 80 MG: 80 TABLET ORAL at 22:42

## 2018-06-29 RX ADMIN — OXYCODONE HYDROCHLORIDE AND ACETAMINOPHEN 2 TABLET: 5; 325 TABLET ORAL at 22:35

## 2018-06-29 RX ADMIN — IBUPROFEN 800 MG: 400 TABLET ORAL at 08:44

## 2018-06-29 RX ADMIN — DOCUSATE SODIUM 100 MG: 100 CAPSULE, LIQUID FILLED ORAL at 17:26

## 2018-06-29 NOTE — LACTATION NOTE
This note was copied from a baby's chart. Infant not seen at breast this morning, but per mom, baby nursing well and has improved throughout post partum stay, deep latch maintained, mother is comfortable, milk is in transition, baby feeding vigorously with rhythmic suck, swallow, breathe pattern, with audible swallowing, and evident milk transfer, both breasts offerd, baby is asleep following feeding. Baby is feeding on demand, voiding and stools present as appropriate over the last 24 hours. Breasts may become engorged when milk \"comes in\". How milk is made / normal phases of milk production, supply and demand discussed. Taught care of engorged breasts - frequent breastfeeding encouraged, warm compresses and breast massage ac. Then nurse the baby or pump. Apply cold compresses pc x 15 minutes a few times a day for swelling or discomfort. May need to do this care for a couple of days. Discussed prevention and treatment of mastitis. Handout provided. Opportunity for questions provided.

## 2018-06-29 NOTE — PROGRESS NOTES
Report received from Galindo Cantrell RN using SBAR. Pt states she passed a large clot in the toilet; clot appears to be around plum sized; fundus is firm at U-1 and bleeding is small; advised pt to call out if she passes another large clot; pt is trying to ambulate in room to pass gas.

## 2018-06-29 NOTE — PROGRESS NOTES
Post-Operative Day Number 2 Progress Note    Patient doing well post-op day 2 from  delivery without significant complaints. Pain controlled on current medication. Voiding without difficulty, normal lochia. Denies cp/sob/n/v. Ambulating without problem. No flatus. Vitals:  Patient Vitals for the past 8 hrs:   BP Temp Pulse Resp   18 0420 110/70 98.5 °F (36.9 °C) 68 16     Temp (24hrs), Av.5 °F (36.9 °C), Min:98.1 °F (36.7 °C), Max:98.7 °F (37.1 °C)      Vital signs stable, afebrile. Exam:  Patient without distress. Heart regular rate and rhythm   Lungs CTA b/l               Abdomen soft, fundus firm at level of umbilicus, non tender. Bandage dry and clean without erythema. Lower extremities are negative for swelling, cords or tenderness. Lab/Data Review:  no new labs    Assessment and Plan:  Patient appears to be having uncomplicated post- course. Continue routine post-op care and maternal education. Boy-circ today. Benign labs. Pt desires discharge today pending flatus.

## 2018-06-29 NOTE — DISCHARGE SUMMARY
Obstetrical Discharge Summary     Name: Jesus Turner MRN: [id number]  SSN: [id number]    YOB: 1990  Age: 33 y.o. Sex: female      Allergies: Review of patient's allergies indicates no known allergies. Admit Date: 2018    Discharge Date: 18    Admitting Physician: Jacob Elizabeth MD     Attending Physician:  Staci Gaspar DO     * Admission Diagnoses: Normal labor  Normal labor    * Discharge Diagnoses:   Information for the patient's :  Sarah Smoker [[id number]]   Delivery of a 3.78 kg male infant via , Low Transverse on 2018 at 10:49 PM  by . Apgars were 9 and 9. Additional Diagnoses:   Hospital Problems as of 2018  Never Reviewed          Codes Class Noted - Resolved POA    Normal labor ICD-10-CM: O80, Z37.9  ICD-9-CM: 121  2018 - Present Unknown             Lab Results   Component Value Date/Time    ABO/Rh(D) A POSITIVE 2018 10:09 PM    Rubella, External immune 2017    GrBStrep, External positive 2018    ABO,Rh A positive 2017      There is no immunization history on file for this patient. * Procedures: Primary LTCS on 18. Boy. Procedure(s):   SECTION         * Discharge Condition: good    * Hospital Course: Normal hospital course following the delivery. * Disposition: Home    Discharge Medications:   Current Discharge Medication List      START taking these medications    Details   ibuprofen (MOTRIN) 800 mg tablet Take 1 Tab by mouth every eight (8) hours as needed for Pain. Qty: 30 Tab, Refills: 1    Associated Diagnoses: Postoperative pain      oxyCODONE-acetaminophen (PERCOCET) 5-325 mg per tablet Take 1 Tab by mouth every six (6) hours as needed. Max Daily Amount: 4 Tabs.   Qty: 20 Tab, Refills: 0    Associated Diagnoses: Postoperative pain         CONTINUE these medications which have NOT CHANGED    Details   albuterol (PROVENTIL) 5 mg/mL nebulizer solution by Nebulization route every four (4) hours as needed for Wheezing. Pt states 2 puffs as needed   Indications: Acute Asthma Attack      PNV66-Iron Fumarate-FA-DSS-DHA 26-1.2- mg cap Take 1 Tab by mouth daily. Indications: pregnancy         STOP taking these medications       valACYclovir (VALTREX) 500 mg tablet Comments:   Reason for Stopping:         traMADol (ULTRAM) 50 mg tablet Comments:   Reason for Stopping:               * Follow-up Care/Patient Instructions:   Activity: no sex, douching, tampons, bath/pool x 6 weeks, no driving on percocet  Diet: Regular Diet  Wound Care: Keep wound clean and dry    Follow-up Information     Follow up With Details Comments Lyle Robbins DO In 6 weeks  [address]  [phone]             Signed By:  Adama Nguyen DO

## 2018-06-30 VITALS
BODY MASS INDEX: 31.47 KG/M2 | RESPIRATION RATE: 16 BRPM | HEIGHT: 62 IN | HEART RATE: 68 BPM | OXYGEN SATURATION: 92 % | TEMPERATURE: 98.3 F | SYSTOLIC BLOOD PRESSURE: 110 MMHG | DIASTOLIC BLOOD PRESSURE: 76 MMHG | WEIGHT: 171 LBS

## 2018-06-30 PROCEDURE — 74011250637 HC RX REV CODE- 250/637: Performed by: OBSTETRICS & GYNECOLOGY

## 2018-06-30 RX ADMIN — IBUPROFEN 800 MG: 400 TABLET ORAL at 02:21

## 2018-06-30 RX ADMIN — IBUPROFEN 800 MG: 400 TABLET ORAL at 10:31

## 2018-06-30 RX ADMIN — OXYCODONE HYDROCHLORIDE AND ACETAMINOPHEN 1 TABLET: 5; 325 TABLET ORAL at 10:31

## 2018-06-30 RX ADMIN — DOCUSATE SODIUM 100 MG: 100 CAPSULE, LIQUID FILLED ORAL at 10:31

## 2018-06-30 RX ADMIN — OXYCODONE HYDROCHLORIDE AND ACETAMINOPHEN 2 TABLET: 5; 325 TABLET ORAL at 03:01

## 2018-06-30 NOTE — ROUTINE PROCESS
0800 Received OB SBAR report at bedside from WADE Ramirez, [facility],[address] Discharge instructions and inn care instructions reviewed with patient. All questions answered. Patient discharge into inn care.

## 2018-06-30 NOTE — PROGRESS NOTES
Post-Operative  Day 3    [address]       Patient doing well without significant complaints. Tolerating diet, passing flatus, voiding and ambulating without difficulty    Vitals:  Visit Vitals    /76 (BP 1 Location: Left arm, BP Patient Position: Sitting)    Pulse 68    Temp 98.3 °F (36.8 °C)    Resp 16    Ht 5' 2\" (1.575 m)    Wt 77.6 kg (171 lb)    SpO2 92%    Breastfeeding Yes    BMI 31.28 kg/m2     Temp (24hrs), Av.3 °F (36.8 °C), Min:97.6 °F (36.4 °C), Max:99 °F (37.2 °C)        Exam:      Patient without distress. Abdomen: soft, expected tenderness, fundus firm                Wound incision clean, dry and intact- dressing removed               Lower extremities are nontender without edema. No cords    Labs:   Lab Results   Component Value Date/Time    WBC 13.3 (H) 2018 08:04 AM    WBC 8.1 2018 05:33 PM    HGB 10.4 (L) 2018 08:04 AM    HGB 11.7 2018 05:33 PM    HCT 31.6 (L) 2018 08:04 AM    HCT 34.5 (L) 2018 05:33 PM    PLATELET 362  08:04 AM    PLATELET 653  05:33 PM       No results found for this or any previous visit (from the past 24 hour(s)). Assessment: Post-Op day 3, doing well  A+/RI/ male infant    Plan:   1. Discharge home today  2. Instructions given- pelvic rest, restrictions on driving and lifting, wound care instructions, follow-up  3.  Discharge Medications: see discharge instruction sheet

## 2018-06-30 NOTE — ROUTINE PROCESS
2000- Bedside shift change report given to WADE Rabago RN (oncoming nurse) by ANNIE Roberson RN (offgoing nurse). Report included the following information SBAR.

## 2018-06-30 NOTE — LACTATION NOTE
This note was copied from a baby's chart. Infant had 11% wt loss overnight. Nurse reports baby has been fussy at breast, and that mother was not collecting much via pump despite mild engorgement. Formula supplement was started via syringe while baby sucked on mother's finger. This morning infant was nipple confused and was rejecting breast.  Infant put to breast with syringe and he quickly started to latch effectively emptying both breasts. Mother's milk is coming in and dripping with hand expression. Baby given formula supplement at breast after breasts were emptied. Baby soundly sleeping after this feeding. Bilirubin level and repeat wt check pending.

## 2018-06-30 NOTE — DISCHARGE INSTRUCTIONS
POSTPARTUM DISCHARGE INSTRUCTIONS       Name:  Gi Greco  YOB: 1990  Admission Diagnosis:  Normal labor  Normal labor     Discharge Diagnosis:    Problem List as of 2018  Never Reviewed          Codes Class Noted - Resolved    Normal labor ICD-10-CM: O80, Z37.9  ICD-9-CM: 891  2018 - Present            Attending Physician:  Pau Sheehan, DO    Delivery Type:   Section: What to Expect at Home    Your Recovery:  A  section, or , is surgery to deliver your baby through a cut, called an incision that the doctor makes in your lower belly and uterus. You may have some pain in your lower belly and need pain medicine for 1 to 2 weeks. You can expect some vaginal bleeding for several weeks. You will probably need about 6 weeks to fully recover. It is important to take it easy while the incision is healing. Avoid heavy lifting, strenuous activities, or exercises that strain the belly muscles while you are recovering. Ask a family member or friend for help with housework, cooking, and shopping. This care sheet gives you a general idea about how long it will take for you to recover. But each person recovers at a different pace. Follow the steps below to get better as quickly as possible. How can you care for yourself at home? Activity  · Rest when you feel tired. Getting enough sleep will help you recover. · Try to walk each day. Start by walking a little more than you did the day before. Bit by bit, increase the amount you walk. Walking boosts blood flow and helps prevent pneumonia, constipation, and blood clots. · Avoid strenuous activities, such as bicycle riding, jogging, weightlifting, and aerobic exercise, for 6 weeks or until your doctor says it is okay. · Until your doctor says it is okay, do not lift anything heavier than your baby.   · Do not do sit-ups or other exercise that strain the belly muscles for 6 weeks or until your doctor says it is okay.  · Hold a pillow over your incision when you cough or take deep breaths. This will support your belly and decrease your pain. · You may shower as usual. Pat the incision dry when you are done. · You will have some vaginal bleeding. Wear sanitary pads. Do not douche or use tampons until your doctor says it is okay. · Ask your doctor when you can drive again. · You will probably need to take at least 6 weeks off work. It depends on the type of work you do and how you feel. · Wait until you are healed (about 4 to 6 weeks) before you have sexual intercourse. Your doctor will tell you when it is okay to have sex. · Talk to your doctor about birth control. You can get pregnant even before your period returns. Also, you can get pregnant while you are breast-feeding. Incision care  Your skin is your body's first line of defense against germs, but an incision site leaves an easy way for germs to enter your body. To prevent infection:  · Clean your hands frequently and before and after changing any touching any dressings. · If you have strips of tape on the incision, leave the tape on for a week or until it falls off. · Look at your incision closely every day for any changes. Contact your doctor if you experience any signs of infection, such as fever or increased redness at the surgical site. · Wash the area daily with warm, soapy water, and pat it dry. Don't use hydrogen peroxide or alcohol, which can slow healing. You may cover the area with a gauze bandage if it weeps or rubs against clothing. Change the bandage every day. · Keep the area clean and dry. Diet  · You can eat your normal diet. If your stomach is upset, try bland, low-fat foods like plain rice, broiled chicken, toast, and yogurt. · Drink plenty of fluids (unless your doctor tells you not to). · You may notice that your bowel movements are not regular right after your surgery. This is common.  Try to avoid constipation and straining with bowel movements. You may want to take a fiber supplement every day. If you have not had a bowel movement after a couple of days, ask your doctor about taking a mild laxative. · If you are breast-feeding, do not drink any alcohol. Medicines  · Take pain medicines exactly as directed. · If the doctor gave you a prescription medicine for pain, take it as prescribed. · If you are not taking a prescription pain medicine, ask your doctor if you can take an over-the-counter medicine such as acetaminophen (Tylenol), ibuprofen (Advil, Motrin), or naproxen (Aleve), for cramps. Read and follow all instructions on the label. Do not take aspirin, because it can cause more bleeding. Do not take acetaminophen (Tylenol) and other acetaminophen containing medications (i.e. Percocet) at the same time. · If you think your pain medicine is making you sick to your stomach:  · Take your medicine after meals (unless your doctor has told you not to). · Ask your doctor for a different pain medicine. · If your doctor prescribed antibiotics, take them as directed. Do not stop taking them just because you feel better. You need to take the full course of antibiotics. Mental Health  · Many women get the \"baby blues\" during the first few days after childbirth. You may lose sleep, feel irritable, and cry easily. You may feel happy one minute and sad the next. Hormone changes are one cause of these emotional changes. Also, the demands of a new baby, along with visits from relatives or other family needs, add to a mother's stress. The \"baby blues\" often peak around the fourth day. Then they ease up in less than 2 weeks. · If your moodiness or anxiety lasts for more than 2 weeks, or if you feel like life is not worth living, you may have postpartum depression. This is different for each mother. Some mothers with serious depression may worry intensely about their infant's well-being. Others may feel distant from their child.  Some mothers might even feel that they might harm their baby. A mother may have signs of paranoia, wondering if someone is watching her. · With all the changes in your life, you may not know if you are depressed. Pregnancy sometimes causes changes in how you feel that are similar to the symptoms of depression. · Symptoms of depression include:  · Feeling sad or hopeless and losing interest in daily activities. These are the most common symptoms of depression. · Sleeping too much or not enough. · Feeling tired. You may feel as if you have no energy. · Eating too much or too little. · POSTPARTUM SUPPORT INTERNATIONAL (PSI) offers a Warm line; Chat with the Expert phone sessions; Information and Articles about Pregnancy and Postpartum Mood Disorders; Comprehensive List of Free Support Groups; Knowledgeable local coordinators who will offer support, information, and resources; Guide to Resources on [employer]; Calendar of events in the  mood disorders community; Latest News and Research; and [facility] [address] for [employer]. Remember - You are not alone; You are not to blame; With help, you will be well. 7-810-902-PPD(9930). WWW. POSTPARTUM. NET   · Writing or talking about death, such as writing suicide notes or talking about guns, knives, or pills. Keep the numbers for these national suicide hotlines: 0-511-017-TALK (6-806.197.8331) and 0-487-GWAYFOU (7-197.331.9408). If you or someone you know talks about suicide or feeling hopeless, get help right away. Other instructions  · If you breast-feed your baby, you may be more comfortable while you are healing if you place the baby so that he or she is not resting on your belly. Try tucking your baby under your arm, with his or her body along the side you will be feeding on. Support your baby's upper body with your arm.  With that hand you can control your baby's head to bring his or her mouth to your breast. This is sometimes called the football hold.    Follow-up care is a key part of your treatment and safety. Be sure to make and go to all appointments, and call your doctor if you are having problems. It's also a good idea to know your test results and keep a list of the medicines you take. When should you call for help? Call 911 anytime you think you may need emergency care. For example, call if:  · You are thinking of hurting yourself, your baby, or anyone else. · You passed out (lost consciousness). · You have symptoms of a blood clot in your lung (called a pulmonary embolism). These may include:  · Sudden chest pain. · Trouble breathing. · Coughing up blood. Call your doctor now or seek immediate medical care if:    · You have severe vaginal bleeding. · You are soaking through a pad each hour for 2 or more hours. · Your vaginal bleeding seems to be getting heavier or is still bright red 4 days after delivery. · You are dizzy or lightheaded, or you feel like you may faint. · You are vomiting or cannot keep fluids down. · You have a fever. · You have new or more belly pain. · You have loose stitches, or your incision comes open. · You have symptoms of infection, such as:  · Increased pain, swelling, warmth, or redness. · Red streaks leading from the incision. · Pus draining from the incision. · A fever  · You pass tissue (not just blood). · Your vaginal discharge smells bad. · Your belly feels tender or full and hard. · Your breasts are continuously painful or red. · You feel sad, anxious, or hopeless for more than a few days. · You have sudden, severe pain in your belly. · You have symptoms of a blood clot in your leg (called a deep vein thrombosis), such as:  · Pain in your calf, back of the knee, thigh, or groin. · Redness and swelling in your leg or groin. · You have symptoms of preeclampsia, such as:  · Sudden swelling of your face, hands, or feet. · New vision problems (such as dimness or blurring).   · A severe headache. · Your blood pressure is higher than it should be or rises suddenly. · You have new nausea or vomiting. Watch closely for changes in your health, and be sure to contact your doctor if you have any problems. Additional Information:  Preventing Infection at Home    We care about preventing infection and avoiding the spread of germs - not only when you are in the hospital but also when you return home. When you return home from the hospital, it's important to take the following steps to help prevent infection and avoid spreading germs that could infect you and others. Ask everyone in your home to follow these guidelines, too. Clean Your Hands  · Clean your hands whenever your hands are visibly dirty, before you eat, before or after touching your mouth, nose or eyes, and before preparing food. Clean them after contact with body fluids, using the restroom, touching animals or changing diapers. · When washing hands, wet them with warm water and work up a lather. Rub hands for at least 15 seconds, then rinse them and pat them dry with a clean towel or paper towel. · When using hand sanitizers, it should take about 15 seconds to rub your hands dry. If not, you probably didn't apply enough . Cover Your Sneeze or Cough  Germs are released into the air whenever you sneeze or cough. To prevent the spread of infection:  · Turn away from other people before coughing or sneezing. · Cover your mouth or nose with a tissue when you cough or sneeze. Put the tissue in the trash. · If you don't have a tissue, cough or sneeze into your upper sleeve, not your hands. · Always clean your hands after coughing or sneezing. Care for Wounds  Your skin is your body's first line of defense against germs, but an open wound leaves an easy way for germs to enter your body.  To prevent infection:  · Clean your hands before and after changing wound dressings, and wear gloves to change dressings if recommended by your doctor. · Take special care with IV lines or other devices inserted into the body. If you must touch them, clean your hands first.  · Follow any specific instructions from your doctor to care for your wounds. Contact your doctor if you experience any signs of infection, such as fever or increased redness at the surgical or wound site. Keep a Clean Home  · Clean or wipe commonly touched hard surfaces like door handles, sinks, tabletops, phones and TV remotes. · Use products labeled \"disinfectant\" to kill harmful bacteria and viruses. · Use a clean cloth or paper towel to clean and dry surfaces. Wiping surfaces with a dirty dishcloth, sponge or towel will only spread germs. · Never share toothbrushes, mcleod, drinking glasses, utensils, razor blades, face cloths or bath towels to avoid spreading germs. · Be sure that the linens that you sleep on are clean. · Keep pets away from wounds and wash your hands after touching pets, their toys or bedding. We care about you and your health. Remember, preventing infections is a   team effort between you, your family, friends and health care providers. Postpartum Support    PARENTS:  Are you feeling sad or depressed? Is it difficult for you to enjoy yourself? Do you feel more irritable or tense? Do you feel anxious or panicky? Are you having difficulty bonding with your baby? Do you feel as if you are \"out of control\" or \"going crazy\"? Are you worried that you might hurt your baby or yourself? FAMILIES: Do you worry that something is wrong but don't know how to help? Do you think that your partner or spouse is having problems coping? Are you worried that it may never get better? While many women experience some mild mood change or \"the blues\" during or after the birth of a child, 1 in 9 women experience more significant symptoms of depression or anxiety. 1 in 10 Dads become depressed during the first year.     Things you can do  Being a good parent includes taking care of yourself. If you take care of yourself, you will be able to take better care of your baby and your family. · Talk to a counselor or healthcare provider who has training in  mood and anxiety problems. · Learn as much as you can about pregnancy and postpartum depression and anxiety. · Get support from family and friends. Ask for help when you need it. · Join a support group in your area or online. · Keep active by walking, stretching or whatever form of exercise helps you to feel better. · Get enough rest and time for yourself. · Eat a healthy diet. · Don't give up! It may take more than one try to get the right help you need. These are general instructions for a healthy lifestyle:    No smoking/ No tobacco products/ Avoid exposure to second hand smoke    Surgeon General's Warning:  Quitting smoking now greatly reduces serious risk to your health. Obesity, smoking, and sedentary lifestyle greatly increases your risk for illness    A healthy diet, regular physical exercise & weight monitoring are important for maintaining a healthy lifestyle    Recognize signs and symptoms of STROKE:    F-face looks uneven    A-arms unable to move or move unevenly    S-speech slurred or non-existent    T-time-call 911 as soon as signs and symptoms begin - DO NOT go       back to bed or wait to see if you get better - TIME IS BRAIN. I have had the opportunity to make my options or choices for discharge. I have received and understand these instructions.

## 2018-07-12 ENCOUNTER — ED HISTORICAL/CONVERTED ENCOUNTER (OUTPATIENT)
Dept: OTHER | Age: 28
End: 2018-07-12

## 2019-01-02 NOTE — PROGRESS NOTES
Anesthesia postop pain note  POD#1 s/p spinal Duramorph. Pain well controlled overnight. No nausea/vomiting/respiratory depression. Still some pruritis.    Plan: Continue spinal duramorph orderset for first 24hrs then pain management per surgical team. Statement Selected

## 2019-01-06 ENCOUNTER — ED HISTORICAL/CONVERTED ENCOUNTER (OUTPATIENT)
Dept: OTHER | Age: 29
End: 2019-01-06

## 2019-03-13 ENCOUNTER — ED HISTORICAL/CONVERTED ENCOUNTER (OUTPATIENT)
Dept: OTHER | Age: 29
End: 2019-03-13

## 2019-05-12 ENCOUNTER — ED HISTORICAL/CONVERTED ENCOUNTER (OUTPATIENT)
Dept: OTHER | Age: 29
End: 2019-05-12

## 2020-06-09 LAB
CHLAMYDIA, EXTERNAL: NEGATIVE
HBSAG, EXTERNAL: NEGATIVE
HIV, EXTERNAL: NEGATIVE
N. GONORRHEA, EXTERNAL: NEGATIVE
RUBELLA, EXTERNAL: NORMAL
T. PALLIDUM, EXTERNAL: NON REACTIVE

## 2020-12-24 ENCOUNTER — HOSPITAL ENCOUNTER (OUTPATIENT)
Dept: PREADMISSION TESTING | Age: 30
Discharge: HOME | End: 2020-12-24
Payer: OTHER GOVERNMENT

## 2020-12-24 ENCOUNTER — TRANSCRIBE ORDER (OUTPATIENT)
Dept: REGISTRATION | Age: 30
End: 2020-12-24

## 2020-12-24 DIAGNOSIS — Z01.812 PRE-PROCEDURE LAB EXAM: Primary | ICD-10-CM

## 2020-12-24 DIAGNOSIS — Z01.812 PRE-PROCEDURE LAB EXAM: ICD-10-CM

## 2020-12-24 PROCEDURE — 87635 SARS-COV-2 COVID-19 AMP PRB: CPT

## 2020-12-25 LAB — SARS-COV-2, COV2NT: NOT DETECTED

## 2020-12-28 ENCOUNTER — ANESTHESIA EVENT (OUTPATIENT)
Dept: LABOR AND DELIVERY | Age: 30
DRG: 773 | End: 2020-12-28
Payer: OTHER GOVERNMENT

## 2020-12-31 ENCOUNTER — HOSPITAL ENCOUNTER (INPATIENT)
Age: 30
LOS: 3 days | Discharge: HOME | DRG: 773 | End: 2021-01-03
Attending: OBSTETRICS & GYNECOLOGY | Admitting: OBSTETRICS & GYNECOLOGY
Payer: OTHER GOVERNMENT

## 2020-12-31 ENCOUNTER — ANESTHESIA (OUTPATIENT)
Dept: LABOR AND DELIVERY | Age: 30
DRG: 773 | End: 2020-12-31
Payer: OTHER GOVERNMENT

## 2020-12-31 DIAGNOSIS — G89.18 POSTOPERATIVE PAIN: ICD-10-CM

## 2020-12-31 LAB
ABO + RH BLD: NORMAL
BLOOD GROUP ANTIBODIES SERPL: NORMAL
ERYTHROCYTE [DISTWIDTH] IN BLOOD BY AUTOMATED COUNT: 17.3 % (ref 11.5–14.5)
HCT VFR BLD AUTO: 32.9 % (ref 35–47)
HGB BLD-MCNC: 10.9 G/DL (ref 11.5–16)
MCH RBC QN AUTO: 27.7 PG (ref 26–34)
MCHC RBC AUTO-ENTMCNC: 33.1 G/DL (ref 30–36.5)
MCV RBC AUTO: 83.5 FL (ref 80–99)
NRBC # BLD: 0 K/UL (ref 0–0.01)
NRBC BLD-RTO: 0 PER 100 WBC
PLATELET # BLD AUTO: 223 K/UL (ref 150–400)
PMV BLD AUTO: 11.7 FL (ref 8.9–12.9)
RBC # BLD AUTO: 3.94 M/UL (ref 3.8–5.2)
SPECIMEN EXP DATE BLD: NORMAL
WBC # BLD AUTO: 7 K/UL (ref 3.6–11)

## 2020-12-31 PROCEDURE — 74011000250 HC RX REV CODE- 250: Performed by: OBSTETRICS & GYNECOLOGY

## 2020-12-31 PROCEDURE — 74011250636 HC RX REV CODE- 250/636: Performed by: ANESTHESIOLOGY

## 2020-12-31 PROCEDURE — 75410000003 HC RECOV DEL/VAG/CSECN EA 0.5 HR: Performed by: OBSTETRICS & GYNECOLOGY

## 2020-12-31 PROCEDURE — 36415 COLL VENOUS BLD VENIPUNCTURE: CPT

## 2020-12-31 PROCEDURE — 74011250636 HC RX REV CODE- 250/636: Performed by: NURSE ANESTHETIST, CERTIFIED REGISTERED

## 2020-12-31 PROCEDURE — 77030018831 HC SOL IRR H20 BAXT -A

## 2020-12-31 PROCEDURE — 86901 BLOOD TYPING SEROLOGIC RH(D): CPT

## 2020-12-31 PROCEDURE — 77030002933 HC SUT MCRYL J&J -A

## 2020-12-31 PROCEDURE — 2709999900 HC NON-CHARGEABLE SUPPLY

## 2020-12-31 PROCEDURE — 76060000078 HC EPIDURAL ANESTHESIA: Performed by: OBSTETRICS & GYNECOLOGY

## 2020-12-31 PROCEDURE — 74011000250 HC RX REV CODE- 250: Performed by: ANESTHESIOLOGY

## 2020-12-31 PROCEDURE — 74011250636 HC RX REV CODE- 250/636: Performed by: OBSTETRICS & GYNECOLOGY

## 2020-12-31 PROCEDURE — 77030040361 HC SLV COMPR DVT MDII -B

## 2020-12-31 PROCEDURE — 77030031139 HC SUT VCRL2 J&J -A

## 2020-12-31 PROCEDURE — 76010000391 HC C SECN FIRST 1 HR: Performed by: OBSTETRICS & GYNECOLOGY

## 2020-12-31 PROCEDURE — 65410000002 HC RM PRIVATE OB

## 2020-12-31 PROCEDURE — 76010000392 HC C SECN EA ADDL 0.5 HR: Performed by: OBSTETRICS & GYNECOLOGY

## 2020-12-31 PROCEDURE — 77030007866 HC KT SPN ANES BBMI -B: Performed by: ANESTHESIOLOGY

## 2020-12-31 PROCEDURE — 85027 COMPLETE CBC AUTOMATED: CPT

## 2020-12-31 PROCEDURE — 75410000003 HC RECOV DEL/VAG/CSECN EA 0.5 HR

## 2020-12-31 PROCEDURE — 77030041076 HC DRSG AG OPTICELL MDII -A

## 2020-12-31 PROCEDURE — 77030018842 HC SOL IRR SOD CL 9% BAXT -A

## 2020-12-31 PROCEDURE — C1765 ADHESION BARRIER: HCPCS

## 2020-12-31 PROCEDURE — 77030011220 HC DEV ELECSURG COVD -B

## 2020-12-31 RX ORDER — BUTORPHANOL TARTRATE 1 MG/ML
1 INJECTION INTRAMUSCULAR; INTRAVENOUS
Status: COMPLETED | OUTPATIENT
Start: 2020-12-31 | End: 2020-12-31

## 2020-12-31 RX ORDER — SIMETHICONE 80 MG
80 TABLET,CHEWABLE ORAL
Status: DISCONTINUED | OUTPATIENT
Start: 2020-12-31 | End: 2021-01-03 | Stop reason: HOSPADM

## 2020-12-31 RX ORDER — OXYCODONE AND ACETAMINOPHEN 5; 325 MG/1; MG/1
1 TABLET ORAL
Status: DISCONTINUED | OUTPATIENT
Start: 2020-12-31 | End: 2021-01-03 | Stop reason: HOSPADM

## 2020-12-31 RX ORDER — ONDANSETRON 2 MG/ML
4 INJECTION INTRAMUSCULAR; INTRAVENOUS AS NEEDED
Status: COMPLETED | OUTPATIENT
Start: 2020-12-31 | End: 2020-12-31

## 2020-12-31 RX ORDER — NALOXONE HYDROCHLORIDE 0.4 MG/ML
0.2 INJECTION, SOLUTION INTRAMUSCULAR; INTRAVENOUS; SUBCUTANEOUS AS NEEDED
Status: DISCONTINUED | OUTPATIENT
Start: 2020-12-31 | End: 2021-01-03 | Stop reason: HOSPADM

## 2020-12-31 RX ORDER — SODIUM CHLORIDE 0.9 % (FLUSH) 0.9 %
5-40 SYRINGE (ML) INJECTION EVERY 8 HOURS
Status: DISCONTINUED | OUTPATIENT
Start: 2020-12-31 | End: 2021-01-03 | Stop reason: HOSPADM

## 2020-12-31 RX ORDER — OXYTOCIN/RINGER'S LACTATE 20/1000 ML
PLASTIC BAG, INJECTION (ML) INTRAVENOUS
Status: DISCONTINUED | OUTPATIENT
Start: 2020-12-31 | End: 2020-12-31 | Stop reason: HOSPADM

## 2020-12-31 RX ORDER — OXYTOCIN/RINGER'S LACTATE 30/500 ML
87.3 PLASTIC BAG, INJECTION (ML) INTRAVENOUS AS NEEDED
Status: DISCONTINUED | OUTPATIENT
Start: 2020-12-31 | End: 2020-12-31 | Stop reason: HOSPADM

## 2020-12-31 RX ORDER — IBUPROFEN 400 MG/1
800 TABLET ORAL EVERY 8 HOURS
Status: DISCONTINUED | OUTPATIENT
Start: 2020-12-31 | End: 2021-01-03 | Stop reason: HOSPADM

## 2020-12-31 RX ORDER — KETOROLAC TROMETHAMINE 30 MG/ML
30 INJECTION, SOLUTION INTRAMUSCULAR; INTRAVENOUS
Status: DISPENSED | OUTPATIENT
Start: 2020-12-31 | End: 2021-01-01

## 2020-12-31 RX ORDER — SODIUM CHLORIDE, SODIUM LACTATE, POTASSIUM CHLORIDE, CALCIUM CHLORIDE 600; 310; 30; 20 MG/100ML; MG/100ML; MG/100ML; MG/100ML
1000 INJECTION, SOLUTION INTRAVENOUS CONTINUOUS
Status: DISCONTINUED | OUTPATIENT
Start: 2020-12-31 | End: 2020-12-31 | Stop reason: HOSPADM

## 2020-12-31 RX ORDER — SODIUM CHLORIDE 0.9 % (FLUSH) 0.9 %
5-40 SYRINGE (ML) INJECTION AS NEEDED
Status: DISCONTINUED | OUTPATIENT
Start: 2020-12-31 | End: 2021-01-03 | Stop reason: HOSPADM

## 2020-12-31 RX ORDER — SODIUM CHLORIDE 0.9 % (FLUSH) 0.9 %
5-40 SYRINGE (ML) INJECTION AS NEEDED
Status: DISCONTINUED | OUTPATIENT
Start: 2020-12-31 | End: 2020-12-31 | Stop reason: HOSPADM

## 2020-12-31 RX ORDER — SODIUM CHLORIDE 0.9 % (FLUSH) 0.9 %
5-40 SYRINGE (ML) INJECTION EVERY 8 HOURS
Status: DISCONTINUED | OUTPATIENT
Start: 2020-12-31 | End: 2020-12-31 | Stop reason: HOSPADM

## 2020-12-31 RX ORDER — BUPIVACAINE HYDROCHLORIDE 7.5 MG/ML
INJECTION, SOLUTION EPIDURAL; RETROBULBAR
Status: COMPLETED | OUTPATIENT
Start: 2020-12-31 | End: 2020-12-31

## 2020-12-31 RX ORDER — AMMONIA 15 % (W/V)
1 AMPUL (EA) INHALATION AS NEEDED
Status: DISCONTINUED | OUTPATIENT
Start: 2020-12-31 | End: 2021-01-03 | Stop reason: HOSPADM

## 2020-12-31 RX ORDER — SODIUM CHLORIDE, SODIUM LACTATE, POTASSIUM CHLORIDE, CALCIUM CHLORIDE 600; 310; 30; 20 MG/100ML; MG/100ML; MG/100ML; MG/100ML
125 INJECTION, SOLUTION INTRAVENOUS CONTINUOUS
Status: DISCONTINUED | OUTPATIENT
Start: 2020-12-31 | End: 2021-01-03 | Stop reason: HOSPADM

## 2020-12-31 RX ORDER — WATER FOR INJECTION,STERILE
VIAL (ML) INJECTION
Status: DISPENSED
Start: 2020-12-31 | End: 2020-12-31

## 2020-12-31 RX ORDER — MORPHINE SULFATE 2 MG/ML
2 INJECTION, SOLUTION INTRAMUSCULAR; INTRAVENOUS
Status: DISCONTINUED | OUTPATIENT
Start: 2020-12-31 | End: 2021-01-03 | Stop reason: HOSPADM

## 2020-12-31 RX ORDER — OXYTOCIN/RINGER'S LACTATE 30/500 ML
10 PLASTIC BAG, INJECTION (ML) INTRAVENOUS AS NEEDED
Status: DISCONTINUED | OUTPATIENT
Start: 2020-12-31 | End: 2020-12-31 | Stop reason: HOSPADM

## 2020-12-31 RX ORDER — OXYTOCIN/RINGER'S LACTATE 30/500 ML
87.3 PLASTIC BAG, INJECTION (ML) INTRAVENOUS AS NEEDED
Status: DISCONTINUED | OUTPATIENT
Start: 2020-12-31 | End: 2021-01-03 | Stop reason: HOSPADM

## 2020-12-31 RX ORDER — ONDANSETRON 2 MG/ML
INJECTION INTRAMUSCULAR; INTRAVENOUS AS NEEDED
Status: DISCONTINUED | OUTPATIENT
Start: 2020-12-31 | End: 2020-12-31 | Stop reason: HOSPADM

## 2020-12-31 RX ORDER — OXYTOCIN/RINGER'S LACTATE 30/500 ML
10 PLASTIC BAG, INJECTION (ML) INTRAVENOUS AS NEEDED
Status: DISCONTINUED | OUTPATIENT
Start: 2020-12-31 | End: 2021-01-03 | Stop reason: HOSPADM

## 2020-12-31 RX ORDER — MORPHINE SULFATE 0.5 MG/ML
INJECTION, SOLUTION EPIDURAL; INTRATHECAL; INTRAVENOUS
Status: COMPLETED | OUTPATIENT
Start: 2020-12-31 | End: 2020-12-31

## 2020-12-31 RX ORDER — MORPHINE SULFATE 0.5 MG/ML
INJECTION, SOLUTION EPIDURAL; INTRATHECAL; INTRAVENOUS AS NEEDED
Status: DISCONTINUED | OUTPATIENT
Start: 2020-12-31 | End: 2020-12-31 | Stop reason: HOSPADM

## 2020-12-31 RX ORDER — HYDROCORTISONE 1 %
CREAM (GRAM) TOPICAL AS NEEDED
Status: DISCONTINUED | OUTPATIENT
Start: 2020-12-31 | End: 2021-01-03 | Stop reason: HOSPADM

## 2020-12-31 RX ORDER — KETOROLAC TROMETHAMINE 30 MG/ML
INJECTION, SOLUTION INTRAMUSCULAR; INTRAVENOUS AS NEEDED
Status: DISCONTINUED | OUTPATIENT
Start: 2020-12-31 | End: 2020-12-31 | Stop reason: HOSPADM

## 2020-12-31 RX ORDER — OXYTOCIN/RINGER'S LACTATE 30/500 ML
PLASTIC BAG, INJECTION (ML) INTRAVENOUS
Status: DISPENSED
Start: 2020-12-31 | End: 2020-12-31

## 2020-12-31 RX ORDER — DOCUSATE SODIUM 100 MG/1
100 CAPSULE, LIQUID FILLED ORAL
Status: DISCONTINUED | OUTPATIENT
Start: 2020-12-31 | End: 2021-01-03 | Stop reason: HOSPADM

## 2020-12-31 RX ADMIN — CEFAZOLIN 2 G: 1 INJECTION, POWDER, FOR SOLUTION INTRAMUSCULAR; INTRAVENOUS at 08:04

## 2020-12-31 RX ADMIN — MORPHINE SULFATE 4.75 MCG: 0.5 INJECTION, SOLUTION EPIDURAL; INTRATHECAL; INTRAVENOUS at 08:59

## 2020-12-31 RX ADMIN — PHENYLEPHRINE HYDROCHLORIDE 120 MCG: 10 INJECTION INTRAVENOUS at 08:17

## 2020-12-31 RX ADMIN — SODIUM CHLORIDE, POTASSIUM CHLORIDE, SODIUM LACTATE AND CALCIUM CHLORIDE: 600; 310; 30; 20 INJECTION, SOLUTION INTRAVENOUS at 08:00

## 2020-12-31 RX ADMIN — KETOROLAC TROMETHAMINE 30 MG: 30 INJECTION, SOLUTION INTRAMUSCULAR; INTRAVENOUS at 09:21

## 2020-12-31 RX ADMIN — Medication 10 ML: at 22:58

## 2020-12-31 RX ADMIN — MORPHINE SULFATE 0.25 MG: 0.5 INJECTION, SOLUTION EPIDURAL; INTRATHECAL; INTRAVENOUS at 08:17

## 2020-12-31 RX ADMIN — BUTORPHANOL TARTRATE 1 MG: 1 INJECTION, SOLUTION INTRAMUSCULAR; INTRAVENOUS at 13:30

## 2020-12-31 RX ADMIN — SODIUM CHLORIDE, POTASSIUM CHLORIDE, SODIUM LACTATE AND CALCIUM CHLORIDE 125 ML/HR: 600; 310; 30; 20 INJECTION, SOLUTION INTRAVENOUS at 06:52

## 2020-12-31 RX ADMIN — KETOROLAC TROMETHAMINE 30 MG: 30 INJECTION, SOLUTION INTRAMUSCULAR at 17:42

## 2020-12-31 RX ADMIN — SODIUM CHLORIDE, POTASSIUM CHLORIDE, SODIUM LACTATE AND CALCIUM CHLORIDE: 600; 310; 30; 20 INJECTION, SOLUTION INTRAVENOUS at 09:10

## 2020-12-31 RX ADMIN — Medication 10 ML: at 17:41

## 2020-12-31 RX ADMIN — Medication 909 ML/HR: at 08:51

## 2020-12-31 RX ADMIN — BUPIVACAINE HYDROCHLORIDE 10 MG: 7.5 INJECTION, SOLUTION EPIDURAL; RETROBULBAR at 08:17

## 2020-12-31 RX ADMIN — ONDANSETRON HYDROCHLORIDE 4 MG: 2 INJECTION, SOLUTION INTRAMUSCULAR; INTRAVENOUS at 08:20

## 2020-12-31 RX ADMIN — SODIUM CHLORIDE, POTASSIUM CHLORIDE, SODIUM LACTATE AND CALCIUM CHLORIDE 125 ML/HR: 600; 310; 30; 20 INJECTION, SOLUTION INTRAVENOUS at 13:31

## 2020-12-31 RX ADMIN — SODIUM CHLORIDE 5 MG: 9 INJECTION INTRAMUSCULAR; INTRAVENOUS; SUBCUTANEOUS at 13:44

## 2020-12-31 RX ADMIN — ONDANSETRON 4 MG: 2 INJECTION INTRAMUSCULAR; INTRAVENOUS at 11:18

## 2020-12-31 RX ADMIN — KETOROLAC TROMETHAMINE 30 MG: 30 INJECTION, SOLUTION INTRAMUSCULAR at 22:58

## 2020-12-31 RX ADMIN — PHENYLEPHRINE HYDROCHLORIDE 40 MCG/MIN: 10 INJECTION INTRAVENOUS at 08:17

## 2020-12-31 NOTE — PROGRESS NOTES
Bedside shift change report given to LISA Malone RN (oncoming nurse) by GONZALO [id number] RN (offgoing nurse). Report included the following information SBAR, Kardex and MAR.     1125 zofran given for nausea. Pt has had emesis twice    1210 pt transferred to [facility]. Bedside shift change report given to ANNIE Fung RN (oncoming nurse) by LISA Adam RN (offgoing nurse). Report included the following information SBAR, Kardex and MAR.

## 2020-12-31 NOTE — PROGRESS NOTES
Verbal shift change report given to [facility] (oncoming nurse) by Agustin Romero RN (offgoing nurse). Report included the following information SBAR, Kardex, Intake/Output, MAR and Recent Results. Pt sleeping. Spouse awake at bedside.

## 2020-12-31 NOTE — ANESTHESIA POSTPROCEDURE EVALUATION
Post-Anesthesia Evaluation and Assessment    Patient: Cyn Clay MRN: [id number]  SSN: [id number]    YOB: 1990  Age: 35 y.o. Sex: female      I have evaluated the patient and they are stable and ready for discharge from the PACU. Cardiovascular Function/Vital Signs  Visit Vitals  BP (!) 100/57   Pulse 67   Temp 36.6 °C (97.8 °F)   Resp 16   Ht 5' 2\" (1.575 m)   Wt 88.9 kg (196 lb)   SpO2 96%   Breastfeeding No   BMI 35.85 kg/m²       Patient is status post Epidural anesthesia for Procedure(s) with comments:   SECTION - EDC 21. Nausea/Vomiting: None    Postoperative hydration reviewed and adequate. Pain:  Pain Scale 1: Numeric (0 - 10) (20 0704)  Pain Intensity 1: 0 (20 07)   Managed    Neurological Status: At baseline    Mental Status, Level of Consciousness: Alert and  oriented to person, place, and time    Pulmonary Status:   O2 Device: Room air (20 0928)   Adequate oxygenation and airway patent    Complications related to anesthesia: None    Post-anesthesia assessment completed. No concerns    Signed By: Criselda Vallejo MD     2020              Procedure(s):   SECTION. spinal    <BSHSIANPOST>    INITIAL Post-op Vital signs:   Vitals Value Taken Time   BP     Temp     Pulse     Resp     SpO2 96 % 20 0952   Vitals shown include unvalidated device data.

## 2020-12-31 NOTE — LACTATION NOTE
This note was copied from a baby's chart. Initial Lactation Consultation:  Mom is 32yo  delivered today at [address] [place] [address] St via repeat C/S gestation 44 1/7 weeks. Lactation history: breast fed her 21 month old for 3-4 months until supply dropped when she went back to work. I did not see infant at breast. Per parents infant has latched deeply 3 times since delivery. Discussed with parents: positioning and alternating positions, using pillows to support nursing couplet, characteristics deep v shallow latch, and feeding cues. Demonstrated breast massage and hand expression with drops of colostrum easily expressed    [place] behaviors reviewed, Very sleepy in first 24 hours, mother must wake baby for feedings, offer hand expressed drops, baby usually will respond and feed vigorously 6-8 times in the first day, but is important to offer 8-12 times,  After baby wakes from deep sleep usually on the 2nd or 3rd day a new behavior pattern follows. Frequent feeding during this brief behavioral phase preceeding milk transition is called cluster feeding. Typical  behavior: baby becomes vigorous at the breast and wants to feed frequently- every 1-2 hours for several feedings. This is the normal process by which the baby demands his/her supply. This type of frequent feeding is the stimulation which causes lactogenesis II (milk coming in). Feeding Plan: Mother will keep baby skin to skin as often as possible, feed on demand, 8-12x/day , respond to feeding cues, obtain latch, listen for audible swallowing, be aware of signs of oxytocin release/ cramping,thirst,sleepiness while breastfeeding, offer both breasts,and will not limit feedings. Mother agrees to utilize breast massage while nursing to facilitate lactogenesis.

## 2020-12-31 NOTE — H&P
History & Physical    Name: Antoni Rodriguez MRN: [id number]  SSN: [id number]    YOB: 1990  Age: 35 y.o. Sex: female      Subjective: scheduled C/S     Estimated Date of Delivery: 21  OB History        2    Para   1    Term   1            AB        Living   1       SAB        TAB        Ectopic        Molar        Multiple   0    Live Births   1                Ms. Ryan Kelley admitted with pregnancy at 36w3d for  section due to previous  section. Prenatal course was normal. Please see prenatal records for details. Past Medical History:   Diagnosis Date    Abnormal Papanicolaou smear of cervix     colposcopy , normal since    Anemia     states hx of anemia, has been normal during pregnancy    Asthma     last used inhaler in past week    Genital herpes     states last ourbreak in May, has been taking Valtrex since before pregnancy    Psychiatric problem     depression and anxiety, had been taking medication, but stopped when found out she was pregnant     Past Surgical History:   Procedure Laterality Date    HX  SECTION      Previous      Social History     Occupational History    Not on file   Tobacco Use    Smoking status: Former Smoker     Quit date: 2017     Years since quitting: 3.2    Smokeless tobacco: Former User     Quit date: 2016   Substance and Sexual Activity    Alcohol use: No    Drug use: No    Sexual activity: Yes     Partners: Male     Family History   Problem Relation Age of Onset    Other Mother         hypothyroidism    Hypertension Brother     Diabetes Maternal Grandfather     Hypertension Maternal Grandfather        No Known Allergies  Prior to Admission medications    Medication Sig Start Date End Date Taking? Authorizing Provider   albuterol (PROVENTIL) 5 mg/mL nebulizer solution by Nebulization route every four (4) hours as needed for Wheezing.  Pt states 2 puffs as needed   Indications: Acute Asthma Attack Yes Provider, Historical   PNV66-Iron Fumarate-FA-DSS-DHA 26-1.2- mg cap Take 1 Tab by mouth daily. Indications: pregnancy   Yes Provider, Historical   ibuprofen (MOTRIN) 800 mg tablet Take 1 Tab by mouth every eight (8) hours as needed for Pain. 18   Erick Muñoz DO   oxyCODONE-acetaminophen (PERCOCET) 5-325 mg per tablet Take 1 Tab by mouth every six (6) hours as needed. Max Daily Amount: 4 Tabs. 18   Erick Muñoz DO        Review of Systems: A comprehensive review of systems was negative except for that written in the History of Present Illness. Objective:     Vitals:  Vitals:    20 0626 20 0644   BP: 100/85    Weight:  88.9 kg (196 lb)   Height:  5' 2\" (1.575 m)        Physical Exam:  Patient without distress. Abdomen: soft, nontender  Fundus: soft and non tender  Lower Extremities:  - Edema No  Membranes:  Intact  Fetal Heart Rate: Reactive    Prenatal Labs:   Lab Results   Component Value Date/Time    Rubella, External immune 2017    GrBStrep, External positive 2018    HBsAg, External negative 2018    HIV, External non reactive 2018    RPR, External non reactive 2018    Gonorrhea, External negative 2017    Chlamydia, External negative 2017        Impression/Plan:     Plan:  Admit for  section. Group B Strep was positive, will treat prophylactically with abx for prophylaxis for surgery. Discussed the risks of surgery including the risks of bleeding, infection, deep vein thrombosis, and surgical injuries to internal organs including but not limited to the bowels, bladder, rectum, and female reproductive organs. The patient understands the risks; any and all questions were answered to the patient's satisfaction.     Signed By:  Megha Patel DO     2020

## 2020-12-31 NOTE — ROUTINE PROCESS
TRANSFER - IN REPORT:    Verbal report received from LISA Aviles on [employer]  being received from L&D for routine progression of care      Report consisted of patients Situation, Background, Assessment and   Recommendations(SBAR). Information from the following report(s) SBAR was reviewed with the receiving nurse. Opportunity for questions and clarification was provided. Assessment completed upon patients arrival to unit and care assumed. 1345:  Pt c/o N&V and itching; pt states the medicine she received earlier for N&V did not help; call placed to Dr. Regina Carrera from Anesthesia; order received for Compazine and Dr. Regina Carrera states pt can have Stadol, which is already ordered, for itching; Stadol and Compazine given IV.    1517:  Urine output from [facility] 100 ml (approx 25 ml/hr); pt has not moved around much and hasn't been drinking due to N&V; will give pt a little more time to diurese and re-assess.

## 2020-12-31 NOTE — OP NOTES
Section Operative Report       Patient: Codey Hays MRN: [id number]  SSN: [id number]    YOB: 1990  Age: 35 y.o. Sex: female       Date of Procedure: 2020     Preoperative Diagnosis: REPEAT     Postoperative Diagnosis: same but delivered    Procedure: Low Transverse Procedure(s):   SECTION    Surgeon(s):  MD Prateek De DO  Assistant:     Anesthesia: Epidural    Estimated Blood Loss :  700cc    Findings:   Information for the patient's :  Reece Hercules [[id number]]   Delivery of a   female infant on 2020 at 8:48 AM. Apgars were 9  and 9 . Umbilical Cord: 3 Vessels     Umbilical Cord Events: None     Placenta: Expressed removal with Normal appearance. Amniotic Fluid Volume:        Amniotic Fluid Description:  Clear         Specimens:   ID Type Source Tests Collected by Time Destination   1 :  Placenta   Stacy Beauchamp,  2020 0859 Discarded               Complications:  none    Procedure Details:    After informed consent was obtained, the patient was taken to the operating room, where Epidural anesthesia was administered and found to be adequate. Albrecht catheter had been placed using sterile technique. The patient was prepped and draped in the usual sterile fashion. After testing for adequate anesthesia, a Pfannenstiel incision was made and carried to the anterior rectus fascia with the bovie which was nicked in the midline with a scalpel. This incision in the fascia was extended laterally with curved Flores Scissors. The rectus muscles were  from the fascial sheath in a blunt fashion and with flores scissors. The muscles were then parted in the midline, the peritoneum was entered bluntly and stretched. The bladder blade was then inserted. The vesicouterine peritoneum was identified and entered sharply with Metzenbaum scissors.  The bladder flap was then created sharply and the bladder blade was reinserted. A low transverse uterine incision was made with the scalpel and extended laterally with blunt finger dissection. Amniotomy was performed. The babys head was then delivered atraumatically followed by the remainder of the body. The nose and mouth were suctioned. The cord was clamped and cut and the baby was handed off to the waiting  staff. The placenta was then extracted from the uterus. The uterus was exteriorized and cleared of all clots and debris using a moist lap sponge. The uterine incision was closed with number 0 monocryl in a running locking fashion with a second layer used to imbricate the incision. The posterior cul-de-sac was irrigated with warm wet lap. Good hemostasis of the hysterotomy was again confirmed and the uterus was returned to the abdomen. Both lateral gutters were irrigated with warm wet laps and good hemostasis was again reassured throughout, including the hysterotomy, bladder flap, rectus muscles and posterior surface of the fascia. Anterior aspect of the uterus was covered with seprafilm. The fascia was closed with 0 Vicryl in a running fashion. Good hemostasis was assured. The subcuticular layers were reapproximated with 3-0 vicryl in a running fashion. The skin was closed with a 4-0 monocryl in a subcuticular fashion. The patient tolerated the procedure well. Sponge, lap, and needle counts were correct times three and the patient and baby were taken to recovery/postpartum room in stable condition.     Signed By: Candida Torre DO     December 31, 2020

## 2020-12-31 NOTE — PROGRESS NOTES
4227 Pt arrived for scheduled c/section service of Dr Emerald Ryder oriented to room + fht's noted and no complaint of pain    0612 abdominal clipping and surgical wipes done also raúl hose on.    0735 Bedside shift change report given to LISA Peters (oncoming nurse) by Sav Watkins (offgoing nurse). Report included the following information SBAR, MAR and Med Rec Status.

## 2020-12-31 NOTE — ANESTHESIA PREPROCEDURE EVALUATION
Relevant Problems   No relevant active problems       Anesthetic History   No history of anesthetic complications            Review of Systems / Medical History  Patient summary reviewed, nursing notes reviewed and pertinent labs reviewed    Pulmonary            Asthma        Neuro/Psych              Cardiovascular                       GI/Hepatic/Renal                Endo/Other             Other Findings   Comments: Repeat c-sec           Physical Exam    Airway  Mallampati: II  TM Distance: > 6 cm  Neck ROM: normal range of motion   Mouth opening: Normal     Cardiovascular    Rhythm: regular  Rate: normal         Dental  No notable dental hx       Pulmonary  Breath sounds clear to auscultation               Abdominal         Other Findings            Anesthetic Plan    ASA: 2  Anesthesia type: spinal          Induction: Intravenous  Anesthetic plan and risks discussed with: Patient

## 2020-12-31 NOTE — ANESTHESIA PROCEDURE NOTES
Spinal Block    Start time: 12/31/2020 8:10 AM  End time: 12/31/2020 8:17 AM  Performed by: Chris Taylor MD  Authorized by: Chris Taylor MD     Pre-procedure:   Indications: at surgeon's request and primary anesthetic  Preanesthetic Checklist: patient identified, risks and benefits discussed, anesthesia consent, site marked, patient being monitored and timeout performed    Timeout Time: 08:10          Spinal Block:   Patient Position:  Seated  Prep Region:  Lumbar  Prep: Betadine      Location:  L2-3  Technique:  Single shot    Local Dose (mL):  3    Needle:   Needle Type:  Pencan    Attempts:  1      Events: CSF confirmed, no blood with aspiration, no paresthesia, injection resistance, paresthesia and high spinal        Assessment:  Insertion:  Complicated  Patient tolerance:  Patient tolerated the procedure well with no immediate complications

## 2021-01-01 LAB
BASOPHILS # BLD: 0 K/UL (ref 0–0.1)
BASOPHILS NFR BLD: 0 % (ref 0–1)
DIFFERENTIAL METHOD BLD: ABNORMAL
EOSINOPHIL # BLD: 0.1 K/UL (ref 0–0.4)
EOSINOPHIL NFR BLD: 1 % (ref 0–7)
ERYTHROCYTE [DISTWIDTH] IN BLOOD BY AUTOMATED COUNT: 17.4 % (ref 11.5–14.5)
HCT VFR BLD AUTO: 30.9 % (ref 35–47)
HGB BLD-MCNC: 9.9 G/DL (ref 11.5–16)
IMM GRANULOCYTES # BLD AUTO: 0 K/UL (ref 0–0.04)
IMM GRANULOCYTES NFR BLD AUTO: 0 % (ref 0–0.5)
LYMPHOCYTES # BLD: 1.7 K/UL (ref 0.8–3.5)
LYMPHOCYTES NFR BLD: 21 % (ref 12–49)
MCH RBC QN AUTO: 27.4 PG (ref 26–34)
MCHC RBC AUTO-ENTMCNC: 32 G/DL (ref 30–36.5)
MCV RBC AUTO: 85.6 FL (ref 80–99)
MONOCYTES # BLD: 0.7 K/UL (ref 0–1)
MONOCYTES NFR BLD: 8 % (ref 5–13)
NEUTS SEG # BLD: 5.9 K/UL (ref 1.8–8)
NEUTS SEG NFR BLD: 70 % (ref 32–75)
NRBC # BLD: 0 K/UL (ref 0–0.01)
NRBC BLD-RTO: 0 PER 100 WBC
PLATELET # BLD AUTO: 197 K/UL (ref 150–400)
PMV BLD AUTO: 11.8 FL (ref 8.9–12.9)
RBC # BLD AUTO: 3.61 M/UL (ref 3.8–5.2)
WBC # BLD AUTO: 8.4 K/UL (ref 3.6–11)

## 2021-01-01 PROCEDURE — 36415 COLL VENOUS BLD VENIPUNCTURE: CPT

## 2021-01-01 PROCEDURE — 65410000002 HC RM PRIVATE OB

## 2021-01-01 PROCEDURE — 74011250636 HC RX REV CODE- 250/636: Performed by: ANESTHESIOLOGY

## 2021-01-01 PROCEDURE — 85025 COMPLETE CBC W/AUTO DIFF WBC: CPT

## 2021-01-01 PROCEDURE — 74011250637 HC RX REV CODE- 250/637: Performed by: OBSTETRICS & GYNECOLOGY

## 2021-01-01 RX ADMIN — IBUPROFEN 800 MG: 400 TABLET, FILM COATED ORAL at 20:04

## 2021-01-01 RX ADMIN — DOCUSATE SODIUM 100 MG: 100 CAPSULE, LIQUID FILLED ORAL at 13:44

## 2021-01-01 RX ADMIN — IBUPROFEN 800 MG: 400 TABLET, FILM COATED ORAL at 11:04

## 2021-01-01 RX ADMIN — SIMETHICONE 80 MG: 80 TABLET, CHEWABLE ORAL at 13:44

## 2021-01-01 RX ADMIN — OXYCODONE HYDROCHLORIDE AND ACETAMINOPHEN 1 TABLET: 5; 325 TABLET ORAL at 13:44

## 2021-01-01 RX ADMIN — SIMETHICONE 80 MG: 80 TABLET, CHEWABLE ORAL at 17:54

## 2021-01-01 RX ADMIN — KETOROLAC TROMETHAMINE 30 MG: 30 INJECTION, SOLUTION INTRAMUSCULAR at 05:03

## 2021-01-01 RX ADMIN — OXYCODONE HYDROCHLORIDE AND ACETAMINOPHEN 1 TABLET: 5; 325 TABLET ORAL at 21:04

## 2021-01-01 NOTE — ROUTINE PROCESS
Bedside shift change report given to [address] [place] [address] (oncoming nurse) by Gonzalez Arcos RN (offgoing nurse). Report included the following information SBAR.

## 2021-01-01 NOTE — PROGRESS NOTES
Post-Operative Day Number 1 Progress Note    Patient doing well post-op day 1 from  delivery without significant complaints. Pain controlled on current medication. Voiding without difficulty, normal lochia. Vitals:    Patient Vitals for the past 8 hrs:   BP Temp Pulse Resp   21 0600 91/60 98.3 °F (36.8 °C) 64 16     Temp (24hrs), Av.9 °F (36.6 °C), Min:97.5 °F (36.4 °C), Max:98.3 °F (36.8 °C)      Vital signs stable, afebrile. Exam:  Patient without distress. Abdomen soft, fundus firm at level of umbilicus, non tender. Incision dry and clean without erythema. Lower extremities are negative for swelling, cords or tenderness. Lab/Data Review: All lab results for the last 24 hours reviewed. Assessment and Plan:  Patient appears to be having uncomplicated post- course. Continue routine post-op care and maternal education.

## 2021-01-01 NOTE — ROUTINE PROCESS
Bedside and Verbal shift change report given to LISA Rosa (oncoming nurse) by Eduard Romero RN (offgoing nurse). Report included the following information SBAR, Kardex, Procedure Summary, Intake/Output, MAR and Recent Results.

## 2021-01-01 NOTE — ROUTINE PROCESS
Bedside shift change report given to JEAN-CLAUDE Barry RN (oncoming nurse) by FLORES Jung RN (offgoing nurse). Report included the following information SBAR.

## 2021-01-01 NOTE — LACTATION NOTE
This note was copied from a baby's chart. Infant seen at breast; latch was somewhat shallow so assisted mom with obtaining a deeper latch. Helped to position infant in the cross cradle position. Discussed nutritive and non nutritive sucking qualities. Mom will continue to feed infant in response to feeding cues. Discussed cluster feeding and its role in milk production.

## 2021-01-02 PROCEDURE — 74011250637 HC RX REV CODE- 250/637: Performed by: OBSTETRICS & GYNECOLOGY

## 2021-01-02 PROCEDURE — 65410000002 HC RM PRIVATE OB

## 2021-01-02 RX ORDER — OXYCODONE AND ACETAMINOPHEN 5; 325 MG/1; MG/1
1 TABLET ORAL
Qty: 8 TAB | Refills: 0 | Status: SHIPPED | OUTPATIENT
Start: 2021-01-02 | End: 2021-01-05

## 2021-01-02 RX ORDER — IBUPROFEN 800 MG/1
800 TABLET ORAL
Qty: 30 TAB | Refills: 0 | Status: SHIPPED | OUTPATIENT
Start: 2021-01-02

## 2021-01-02 RX ADMIN — OXYCODONE HYDROCHLORIDE AND ACETAMINOPHEN 1 TABLET: 5; 325 TABLET ORAL at 04:16

## 2021-01-02 RX ADMIN — OXYCODONE HYDROCHLORIDE AND ACETAMINOPHEN 1 TABLET: 5; 325 TABLET ORAL at 10:22

## 2021-01-02 RX ADMIN — DOCUSATE SODIUM 100 MG: 100 CAPSULE, LIQUID FILLED ORAL at 09:02

## 2021-01-02 RX ADMIN — OXYCODONE HYDROCHLORIDE AND ACETAMINOPHEN 1 TABLET: 5; 325 TABLET ORAL at 21:54

## 2021-01-02 RX ADMIN — IBUPROFEN 800 MG: 400 TABLET, FILM COATED ORAL at 04:16

## 2021-01-02 RX ADMIN — IBUPROFEN 800 MG: 400 TABLET, FILM COATED ORAL at 22:10

## 2021-01-02 RX ADMIN — SIMETHICONE 80 MG: 80 TABLET, CHEWABLE ORAL at 21:56

## 2021-01-02 RX ADMIN — IBUPROFEN 800 MG: 400 TABLET, FILM COATED ORAL at 12:14

## 2021-01-02 RX ADMIN — OXYCODONE HYDROCHLORIDE AND ACETAMINOPHEN 1 TABLET: 5; 325 TABLET ORAL at 15:09

## 2021-01-02 NOTE — PROGRESS NOTES
Post-Operative Day Number 2 Progress Note    Patient doing well post-op day 2 from  delivery without significant complaints. Pain controlled on current medication. Voiding without difficulty, normal lochia. Passing flatus. Denies cp/sob/n/v. Ambulating without problem. Vitals:    Patient Vitals for the past 8 hrs:   BP Temp Pulse Resp   21 0115 (!) 105/59 97.9 °F (36.6 °C) 70 16     Temp (24hrs), Av.4 °F (36.9 °C), Min:97.9 °F (36.6 °C), Max:98.9 °F (37.2 °C)      Vital signs stable, afebrile. Exam:  Patient without distress. Heart regular rate and rhythm   Lungs CTA b/l               Abdomen soft, fundus firm at level of umbilicus, non tender. Bandage dry and clean without surrounding erythema. Lower extremities are negative for swelling, cords or tenderness. Lab/Data Review:  no new labs    Assessment and Plan:  Patient appears to be having uncomplicated post- course. Continue routine post-op care and maternal education. Girl. Benign labs.

## 2021-01-02 NOTE — ROUTINE PROCESS
Bedside shift change report given to JEAN-CLAUDE Deluca RN (oncoming nurse) by CHANDLER Jensen RN (offgoing nurse). Report included the following information SBAR and MAR.

## 2021-01-02 NOTE — ROUTINE PROCESS
Bedside shift change report given to A Edi RN (oncoming nurse) by Mavis Deluca RN (offgoing nurse). Report included the following information SBAR.

## 2021-01-02 NOTE — DISCHARGE INSTRUCTIONS
POSTPARTUM DISCHARGE INSTRUCTIONS       Name:  Ramos Taylor  YOB: 1990  Admission Diagnosis:  Labor and delivery, indication for care [O75.9]     Discharge Diagnosis:    Problem List as of 2021 Never Reviewed          Codes Class Noted - Resolved    Labor and delivery, indication for care ICD-10-CM: O75.9  ICD-9-CM: 659.90  2020 - Present        Normal labor ICD-10-CM: O80, Z37.9  ICD-9-CM: 972  2018 - Present            Attending Physician:  Dorene Cardenas,     Delivery Type:   Section: What to Expect at Home    Your Recovery:  A  section, or , is surgery to deliver your baby through a cut, called an incision that the doctor makes in your lower belly and uterus. You may have some pain in your lower belly and need pain medicine for 1 to 2 weeks. You can expect some vaginal bleeding for several weeks. You will probably need about 6 weeks to fully recover. It is important to take it easy while the incision is healing. Avoid heavy lifting, strenuous activities, or exercises that strain the belly muscles while you are recovering. Ask a family member or friend for help with housework, cooking, and shopping. This care sheet gives you a general idea about how long it will take for you to recover. But each person recovers at a different pace. Follow the steps below to get better as quickly as possible. How can you care for yourself at home? Activity  · Rest when you feel tired. Getting enough sleep will help you recover. · Try to walk each day. Start by walking a little more than you did the day before. Bit by bit, increase the amount you walk. Walking boosts blood flow and helps prevent pneumonia, constipation, and blood clots. · Avoid strenuous activities, such as bicycle riding, jogging, weightlifting, and aerobic exercise, for 6 weeks or until your doctor says it is okay.   · Until your doctor says it is okay, do not lift anything heavier than your baby. · Do not do sit-ups or other exercise that strain the belly muscles for 6 weeks or until your doctor says it is okay. · Hold a pillow over your incision when you cough or take deep breaths. This will support your belly and decrease your pain. · You may shower as usual. Pat the incision dry when you are done. · You will have some vaginal bleeding. Wear sanitary pads. Do not douche or use tampons until your doctor says it is okay. · Ask your doctor when you can drive again. · You will probably need to take at least 6 weeks off work. It depends on the type of work you do and how you feel. · Wait until you are healed (about 4 to 6 weeks) before you have sexual intercourse. Your doctor will tell you when it is okay to have sex. · Talk to your doctor about birth control. You can get pregnant even before your period returns. Also, you can get pregnant while you are breast-feeding. Incision care  Your skin is your body's first line of defense against germs, but an incision site leaves an easy way for germs to enter your body. To prevent infection:  · Clean your hands frequently and before and after changing any touching any dressings. · If you have strips of tape on the incision, leave the tape on for a week or until it falls off. · Look at your incision closely every day for any changes. Contact your doctor if you experience any signs of infection, such as fever or increased redness at the surgical site. · Wash the area daily with warm, soapy water, and pat it dry. Don't use hydrogen peroxide or alcohol, which can slow healing. You may cover the area with a gauze bandage if it weeps or rubs against clothing. Change the bandage every day. · Keep the area clean and dry. Diet  · You can eat your normal diet. If your stomach is upset, try bland, low-fat foods like plain rice, broiled chicken, toast, and yogurt. · Drink plenty of fluids (unless your doctor tells you not to).   · You may notice that your bowel movements are not regular right after your surgery. This is common. Try to avoid constipation and straining with bowel movements. You may want to take a fiber supplement every day. If you have not had a bowel movement after a couple of days, ask your doctor about taking a mild laxative. · If you are breast-feeding, do not drink any alcohol. Medicines  · Take pain medicines exactly as directed. · If the doctor gave you a prescription medicine for pain, take it as prescribed. · If you are not taking a prescription pain medicine, ask your doctor if you can take an over-the-counter medicine such as acetaminophen (Tylenol), ibuprofen (Advil, Motrin), or naproxen (Aleve), for cramps. Read and follow all instructions on the label. Do not take aspirin, because it can cause more bleeding. Do not take acetaminophen (Tylenol) and other acetaminophen containing medications (i.e. Percocet) at the same time. · If you think your pain medicine is making you sick to your stomach:  · Take your medicine after meals (unless your doctor has told you not to). · Ask your doctor for a different pain medicine. · If your doctor prescribed antibiotics, take them as directed. Do not stop taking them just because you feel better. You need to take the full course of antibiotics. Mental Health  · Many women get the \"baby blues\" during the first few days after childbirth. You may lose sleep, feel irritable, and cry easily. You may feel happy one minute and sad the next. Hormone changes are one cause of these emotional changes. Also, the demands of a new baby, along with visits from relatives or other family needs, add to a mother's stress. The \"baby blues\" often peak around the fourth day. Then they ease up in less than 2 weeks. · If your moodiness or anxiety lasts for more than 2 weeks, or if you feel like life is not worth living, you may have postpartum depression. This is different for each mother.  Some mothers with serious depression may worry intensely about their infant's well-being. Others may feel distant from their child. Some mothers might even feel that they might harm their baby. A mother may have signs of paranoia, wondering if someone is watching her. · With all the changes in your life, you may not know if you are depressed. Pregnancy sometimes causes changes in how you feel that are similar to the symptoms of depression. · Symptoms of depression include:  · Feeling sad or hopeless and losing interest in daily activities. These are the most common symptoms of depression. · Sleeping too much or not enough. · Feeling tired. You may feel as if you have no energy. · Eating too much or too little. · [employer]PARTUM [employer] ([employer]I) offers a Warm line; Chat with the Expert phone sessions; Information and Articles about Pregnancy and Postpartum Mood Disorders; Comprehensive List of Free Support Groups; Knowledgeable local coordinators who will offer support, information, and resources; Guide to Resources on Copious; Calendar of events in the  mood disorders community; Latest News and Research; and [facility] [address] for [employer]. Remember - You are not alone; You are not to blame; With help, you will be well. 5-032-423-PPD(6933). WWW. POSTPARTUM. NET   · Writing or talking about death, such as writing suicide notes or talking about guns, knives, or pills. Keep the numbers for these national suicide hotlines: 0-397-000-TALK (4-293.874.6039) and 8-722-MUSLRJS (5-973.160.9982). If you or someone you know talks about suicide or feeling hopeless, get help right away. Other instructions  · If you breast-feed your baby, you may be more comfortable while you are healing if you place the baby so that he or she is not resting on your belly. Try tucking your baby under your arm, with his or her body along the side you will be feeding on. Support your baby's upper body with your arm.  With that hand you can control your baby's head to bring his or her mouth to your breast. This is sometimes called the football hold. Follow-up care is a key part of your treatment and safety. Be sure to make and go to all appointments, and call your doctor if you are having problems. It's also a good idea to know your test results and keep a list of the medicines you take. When should you call for help? Call 911 anytime you think you may need emergency care. For example, call if:  · You are thinking of hurting yourself, your baby, or anyone else. · You passed out (lost consciousness). · You have symptoms of a blood clot in your lung (called a pulmonary embolism). These may include:  · Sudden chest pain. · Trouble breathing. · Coughing up blood. Call your doctor now or seek immediate medical care if:    · You have severe vaginal bleeding. · You are soaking through a pad each hour for 2 or more hours. · Your vaginal bleeding seems to be getting heavier or is still bright red 4 days after delivery. · You are dizzy or lightheaded, or you feel like you may faint. · You are vomiting or cannot keep fluids down. · You have a fever. · You have new or more belly pain. · You have loose stitches, or your incision comes open. · You have symptoms of infection, such as:  · Increased pain, swelling, warmth, or redness. · Red streaks leading from the incision. · Pus draining from the incision. · A fever  · You pass tissue (not just blood). · Your vaginal discharge smells bad. · Your belly feels tender or full and hard. · Your breasts are continuously painful or red. · You feel sad, anxious, or hopeless for more than a few days. · You have sudden, severe pain in your belly. · You have symptoms of a blood clot in your leg (called a deep vein thrombosis), such as:  · Pain in your calf, back of the knee, thigh, or groin. · Redness and swelling in your leg or groin.   · You have symptoms of preeclampsia, such as:  · Sudden swelling of your face, hands, or feet. · New vision problems (such as dimness or blurring). · A severe headache. · Your blood pressure is higher than it should be or rises suddenly. · You have new nausea or vomiting. Watch closely for changes in your health, and be sure to contact your doctor if you have any problems. Additional Information:  Preventing Infection at Home    We care about preventing infection and avoiding the spread of germs - not only when you are in the hospital but also when you return home. When you return home from the hospital, it's important to take the following steps to help prevent infection and avoid spreading germs that could infect you and others. Ask everyone in your home to follow these guidelines, too. Clean Your Hands  · Clean your hands whenever your hands are visibly dirty, before you eat, before or after touching your mouth, nose or eyes, and before preparing food. Clean them after contact with body fluids, using the restroom, touching animals or changing diapers. · When washing hands, wet them with warm water and work up a lather. Rub hands for at least 15 seconds, then rinse them and pat them dry with a clean towel or paper towel. · When using hand sanitizers, it should take about 15 seconds to rub your hands dry. If not, you probably didn't apply enough . Cover Your Sneeze or Cough  Germs are released into the air whenever you sneeze or cough. To prevent the spread of infection:  · Turn away from other people before coughing or sneezing. · Cover your mouth or nose with a tissue when you cough or sneeze. Put the tissue in the trash. · If you don't have a tissue, cough or sneeze into your upper sleeve, not your hands. · Always clean your hands after coughing or sneezing.     Care for Wounds  Your skin is your body's first line of defense against germs, but an open wound leaves an easy way for germs to enter your body. To prevent infection:  · Clean your hands before and after changing wound dressings, and wear gloves to change dressings if recommended by your doctor. · Take special care with IV lines or other devices inserted into the body. If you must touch them, clean your hands first.  · Follow any specific instructions from your doctor to care for your wounds. Contact your doctor if you experience any signs of infection, such as fever or increased redness at the surgical or wound site. Keep a Clean Home  · Clean or wipe commonly touched hard surfaces like door handles, sinks, tabletops, phones and TV remotes. · Use products labeled \"disinfectant\" to kill harmful bacteria and viruses. · Use a clean cloth or paper towel to clean and dry surfaces. Wiping surfaces with a dirty dishcloth, sponge or towel will only spread germs. · Never share toothbrushes, mcleod, drinking glasses, utensils, razor blades, face cloths or bath towels to avoid spreading germs. · Be sure that the linens that you sleep on are clean. · Keep pets away from wounds and wash your hands after touching pets, their toys or bedding. We care about you and your health. Remember, preventing infections is a   team effort between you, your family, friends and health care providers. Postpartum Support    PARENTS:  Are you feeling sad or depressed? Is it difficult for you to enjoy yourself? Do you feel more irritable or tense? Do you feel anxious or panicky? Are you having difficulty bonding with your baby? Do you feel as if you are \"out of control\" or \"going crazy\"? Are you worried that you might hurt your baby or yourself? FAMILIES: Do you worry that something is wrong but don't know how to help? Do you think that your partner or spouse is having problems coping? Are you worried that it may never get better?      While many women experience some mild mood change or \"the blues\" during or after the birth of a child, 1 in 7 women experience more significant symptoms of depression or anxiety. 1 in 10 Dads become depressed during the first year. Things you can do  Being a good parent includes taking care of yourself. If you take care of yourself, you will be able to take better care of your baby and your family. · Talk to a counselor or healthcare provider who has training in  mood and anxiety problems. · Learn as much as you can about pregnancy and postpartum depression and anxiety. · Get support from family and friends. Ask for help when you need it. · Join a support group in your area or online. · Keep active by walking, stretching or whatever form of exercise helps you to feel better. · Get enough rest and time for yourself. · Eat a healthy diet. · Don't give up! It may take more than one try to get the right help you need. These are general instructions for a healthy lifestyle:    No smoking/ No tobacco products/ Avoid exposure to second hand smoke    Surgeon General's Warning:  Quitting smoking now greatly reduces serious risk to your health. Obesity, smoking, and sedentary lifestyle greatly increases your risk for illness    A healthy diet, regular physical exercise & weight monitoring are important for maintaining a healthy lifestyle    Recognize signs and symptoms of STROKE:    F-face looks uneven    A-arms unable to move or move unevenly    S-speech slurred or non-existent    T-time-call 911 as soon as signs and symptoms begin - DO NOT go       back to bed or wait to see if you get better - TIME IS BRAIN. I have had the opportunity to make my options or choices for discharge. I have received and understand these instructions.

## 2021-01-02 NOTE — DISCHARGE SUMMARY
Obstetrical Discharge Summary     Name: Chance Canas MRN: [id number]  SSN: [id number]    YOB: 1990  Age: 35 y.o. Sex: female      Allergies: Patient has no known allergies. Admit Date: 2020    Discharge Date: 1/3/2021     Admitting Physician: Kan Selby DO     Attending Physician:  Allan Dubon DO     * Admission Diagnoses: Labor and delivery, indication for care [O75.9]    * Discharge Diagnoses:   Information for the patient's :  Terry Saez [[id number]]   Delivery of a 4.24 kg female infant via , Low Transverse on 2020 at 8:48 AM  by Kan Selby. Apgars were 9  and 9 . Additional Diagnoses:   Hospital Problems as of 2021 Never Reviewed          Codes Class Noted - Resolved POA    Labor and delivery, indication for care ICD-10-CM: O75.9  ICD-9-CM: 659.90  2020 - Present Unknown             Lab Results   Component Value Date/Time    ABO/Rh(D) A POSITIVE 2020 06:51 AM    Rubella, External immune 2020    GrBStrep, External positive 2018    ABO,Rh A positive 2017      There is no immunization history on file for this patient. * Procedures: Repeat LTCS on 2020  Procedure(s) with comments:   SECTION - [facility] 21           * Discharge Condition: good    [facility] Course: Normal hospital course following the delivery. * Disposition: Home    Discharge Medications:   Current Discharge Medication List      CONTINUE these medications which have CHANGED    Details   ibuprofen (MOTRIN) 800 mg tablet Take 1 Tab by mouth every eight (8) hours as needed for Pain. Qty: 30 Tab, Refills: 0    Associated Diagnoses: Postoperative pain      oxyCODONE-acetaminophen (PERCOCET) 5-325 mg per tablet Take 1 Tab by mouth every six (6) hours as needed for Pain for up to 3 days. Max Daily Amount: 4 Tabs.   Qty: 8 Tab, Refills: 0    Associated Diagnoses: Postoperative pain         CONTINUE these medications which have NOT CHANGED    Details   albuterol (PROVENTIL) 5 mg/mL nebulizer solution by Nebulization route every four (4) hours as needed for Wheezing. Pt states 2 puffs as needed   Indications: Acute Asthma Attack      PNV66-Iron Fumarate-FA-DSS-DHA 26-1.2- mg cap Take 1 Tab by mouth daily. Indications: pregnancy             * Follow-up Care/Patient Instructions: Activity: no sex, douching, tampons, bath/pool or heavy lifting x 6 weeks. No driving x 2 weeks and must be off narcotic  Diet: Regular Diet  Wound Care: Keep wound clean and dry.      Follow-up Information     Follow up With Specialties Details Why Contact Info    Ivon West DO Obstetrics & Gynecology, Gynecology, Obstetrics In 2 weeks  [address] [place] [address]

## 2021-01-03 VITALS
HEART RATE: 64 BPM | DIASTOLIC BLOOD PRESSURE: 69 MMHG | HEIGHT: 62 IN | WEIGHT: 196 LBS | OXYGEN SATURATION: 95 % | SYSTOLIC BLOOD PRESSURE: 109 MMHG | RESPIRATION RATE: 14 BRPM | TEMPERATURE: 98 F | BODY MASS INDEX: 36.07 KG/M2

## 2021-01-03 PROCEDURE — 74011250637 HC RX REV CODE- 250/637: Performed by: OBSTETRICS & GYNECOLOGY

## 2021-01-03 RX ADMIN — OXYCODONE HYDROCHLORIDE AND ACETAMINOPHEN 1 TABLET: 5; 325 TABLET ORAL at 06:06

## 2021-01-03 RX ADMIN — IBUPROFEN 800 MG: 400 TABLET, FILM COATED ORAL at 06:06

## 2021-01-03 NOTE — LACTATION NOTE
This note was copied from a baby's chart. Mom states the baby has been latching and nursing well. But she has been using some formula before nursing to help calm the baby and get her to latch well. Mom says her breasts are feeling bustamante and she says she thinks her milk is coming in. She continues to pump after nursing and has been able to collect a small amount of breast milk to give in place of the formula. Mom will continue to pump after nursing and offering the baby breastmilk/formula until she returns to the pediatrician to check and the weight loss and they develop another plan. Mom and baby scheduled for discharge this morning. Baby's weight loss is -7.5%. Her bili is 11.6 in the low intermediate risk zone. She has had 4 wets and 4 stools over the last 24 hours. Breast feeding teaching completed and all questions answered.

## 2021-01-03 NOTE — PROGRESS NOTES
Post-Operative Day Number 3 Progress/Discharge Note    Patient doing well post-op day 3 from  delivery without significant complaints. Pain controlled on current medication. Voiding without difficulty, normal lochia. Denies cp/sob/n/v. Ambulating without problem. Passing flatus. Vitals:    Patient Vitals for the past 8 hrs:   BP Temp Pulse   21 0419 115/64 98.4 °F (36.9 °C) 75     Temp (24hrs), Av.4 °F (36.9 °C), Min:98.1 °F (36.7 °C), Max:98.9 °F (37.2 °C)      Vital signs stable, afebrile. Exam:  Patient without distress. Heart regular rate and rhythm   Lungs CTA b/l               Abdomen soft, fundus firm at level below umbilicus, non tender. Incision dry and                      clean without erythema. Lower extremities are negative for swelling, cords or tenderness. Lab/Data Review:  no new labs    Assessment and Plan:  Patient appears to be having uncomplicated post- course. Continue routine post-op care and maternal education. Plan discharge for today with follow up in our office in 2 weeks. Girl. Benign labs.

## 2021-01-03 NOTE — LACTATION NOTE
This note was copied from a baby's chart. Mom states baby can latch and will nurse well but she gets frustrated at the beginning of the feeding. Baby's weight loss is is 8.6% and pediatrician has ordered for baby to receive formula supplementation after nursing. I showed mom how to put a small amount of formula on the breast to keep baby sucking long enough to get her milk to let down. I recommended that mom pump after nursing for extra breast stimulation. I set the breast pump up and showed her how to use it. She will continue to feed the baby according to her feeding cues. She will supplement with a small amount of formula per MD order. She will call out as needed for assistance. Patient/Caregiver provided printed discharge information.

## 2021-01-03 NOTE — ROUTINE PROCESS
Bedside shift change report given to Jody Favre RN (oncoming nurse) by Arleen Burgos RN (offgoing nurse). Report included the following information SBAR.

## 2024-10-07 ENCOUNTER — APPOINTMENT (RX ONLY)
Dept: URBAN - METROPOLITAN AREA CLINIC 83 | Facility: CLINIC | Age: 34
Setting detail: DERMATOLOGY
End: 2024-10-07

## 2024-10-07 DIAGNOSIS — L21.8 OTHER SEBORRHEIC DERMATITIS: ICD-10-CM

## 2024-10-07 DIAGNOSIS — L71.8 OTHER ROSACEA: ICD-10-CM

## 2024-10-07 PROCEDURE — ? COUNSELING

## 2024-10-07 PROCEDURE — 99204 OFFICE O/P NEW MOD 45 MIN: CPT

## 2024-10-07 PROCEDURE — ? TREATMENT REGIMEN

## 2024-10-07 PROCEDURE — ? PRESCRIPTION

## 2024-10-07 RX ORDER — PIMECROLIMUS 10 MG/G
CREAM TOPICAL
Qty: 60 | Refills: 4 | Status: ERX | COMMUNITY
Start: 2024-10-07

## 2024-10-07 RX ADMIN — PIMECROLIMUS: 10 CREAM TOPICAL at 00:00

## 2024-10-07 NOTE — HPI: RASH
What Type Of Note Output Would You Prefer (Optional)?: Standard Output
Is The Patient Presenting As Previously Scheduled?: Yes
How Severe Is Your Rash?: mild
Is This A New Presentation, Or A Follow-Up?: Rash
Additional History: Recurring bumpy and itchy rash on face present for a year. Patient noticed flare during stressful times.

## (undated) DEVICE — Device: Brand: PORTEX

## (undated) DEVICE — ELECTROSURGICAL DEVICE HOLSTER;FOR USE WITH MAXIMUM PEAK VOLTAGE OF 4000 V: Brand: FORCE TRIVERSE

## (undated) DEVICE — DRESSING AQUACEL ADVANTAGE ALG W4XL5IN RECT GREATER ABSRB HYDRFBR TECHNOLOGY

## (undated) DEVICE — ROYAL SILK SURGICAL GOWN, XXL: Brand: CONVERTORS

## (undated) DEVICE — SOLIDIFIER MEDC 1200ML -- CONVERT TO 356117

## (undated) DEVICE — TIP CLEANER: Brand: VALLEYLAB

## (undated) DEVICE — DEVON™ KNEE AND BODY STRAP 60" X 3" (1.5 M X 7.6 CM): Brand: DEVON

## (undated) DEVICE — STERILE POLYISOPRENE POWDER-FREE SURGICAL GLOVES: Brand: PROTEXIS

## (undated) DEVICE — SUTURE VCRL SZ 0 L36IN ABSRB VLT L40MM CT 1/2 CIR J358H

## (undated) DEVICE — SPONGE: LAP 18X18 W  200/CS: Brand: MEDICAL ACTION INDUSTRIES

## (undated) DEVICE — (D)PREP SKN CHLRAPRP APPL 26ML -- CONVERT TO ITEM 371833

## (undated) DEVICE — SOLUTION IRRIG 1000ML H2O STRL BLT

## (undated) DEVICE — APPLICATOR BNDG 1MM ADH PREMIERPRO EXOFIN

## (undated) DEVICE — SUTURE VCRL SZ 2-0 L36IN ABSRB VLT L36MM CT-1 1/2 CIR J345H

## (undated) DEVICE — ROYALSILK SURGICAL GOWN, L: Brand: CONVERTORS

## (undated) DEVICE — SUTURE MCRYL SZ 4-0 L27IN ABSRB UD L24MM PS-1 3/8 CIR PRIM Y935H

## (undated) DEVICE — SUTURE PLN GUT SZ 2-0 L27IN ABSRB YELLOWISH TAN L70MM XLH 53T

## (undated) DEVICE — STERILE POLYISOPRENE POWDER-FREE SURGICAL GLOVES WITH EMOLLIENT COATING: Brand: PROTEXIS

## (undated) DEVICE — LIGHT HANDLE: Brand: DEVON

## (undated) DEVICE — SUTURE VCRL SZ 1 L36IN ABSRB VLT L36MM CT-1 1/2 CIR J347H

## (undated) DEVICE — COVERALL PREM SMS 2XL KNIT --

## (undated) DEVICE — BARRIER TISS ABSRB 5X6IN 1/PK -- DIRECT ORDER ONLY NON MEDLINE

## (undated) DEVICE — SOLUTION IV 1000ML 0.9% SOD CHL

## (undated) DEVICE — SUTURE MCRYL SZ 0 L36IN ABSRB UD L36MM CT-1 1/2 CIR Y946H

## (undated) DEVICE — ANESTHESIA TRAY SPNL W/O NDL PENCAN

## (undated) DEVICE — 3000CC GUARDIAN II: Brand: GUARDIAN

## (undated) DEVICE — PACK PROCEDURE SURG C SECT KT SMH

## (undated) DEVICE — REM POLYHESIVE ADULT PATIENT RETURN ELECTRODE: Brand: VALLEYLAB

## (undated) DEVICE — DRAPE FLD WRM W44XL66IN C6L FOR INTRATEMP SYS THERMABASIN

## (undated) DEVICE — POOLE SUCTION INSTRUMENT WITH REMOVABLE SHEATH: Brand: POOLE

## (undated) DEVICE — 3M™ TEGADERM™ TRANSPARENT FILM DRESSING FRAME STYLE, 1628, 6 IN X 8 IN (15 CM X 20 CM), 10/CT 8CT/CASE: Brand: 3M™ TEGADERM™

## (undated) DEVICE — KENDALL SCD EXPRESS SLEEVES, KNEE LENGTH, MEDIUM: Brand: KENDALL SCD